# Patient Record
Sex: MALE | Race: WHITE | NOT HISPANIC OR LATINO | Employment: FULL TIME | URBAN - METROPOLITAN AREA
[De-identification: names, ages, dates, MRNs, and addresses within clinical notes are randomized per-mention and may not be internally consistent; named-entity substitution may affect disease eponyms.]

---

## 2017-02-23 ENCOUNTER — OFFICE VISIT (OUTPATIENT)
Dept: URGENT CARE | Facility: PHYSICIAN GROUP | Age: 47
End: 2017-02-23
Payer: COMMERCIAL

## 2017-02-23 VITALS
DIASTOLIC BLOOD PRESSURE: 100 MMHG | RESPIRATION RATE: 16 BRPM | HEART RATE: 72 BPM | SYSTOLIC BLOOD PRESSURE: 150 MMHG | BODY MASS INDEX: 33.16 KG/M2 | TEMPERATURE: 97.9 F | OXYGEN SATURATION: 97 % | WEIGHT: 215 LBS

## 2017-02-23 DIAGNOSIS — R20.2 PARESTHESIA OF RIGHT UPPER AND LOWER EXTREMITY: ICD-10-CM

## 2017-02-23 DIAGNOSIS — M79.2 NEUROPATHIC PAIN: ICD-10-CM

## 2017-02-23 PROCEDURE — 99203 OFFICE O/P NEW LOW 30 MIN: CPT | Performed by: FAMILY MEDICINE

## 2017-02-23 RX ORDER — GABAPENTIN 300 MG/1
300 CAPSULE ORAL 3 TIMES DAILY
Qty: 90 CAP | Refills: 1 | Status: SHIPPED | OUTPATIENT
Start: 2017-02-23 | End: 2019-06-03

## 2017-02-23 ASSESSMENT — ENCOUNTER SYMPTOMS: MYALGIAS: 0

## 2017-02-23 NOTE — PROGRESS NOTES
Subjective:      Al Corcoran is a 46 y.o. male who presents with Hand Pain            HPI  3-4 days numbness and tingling right FA and hand with severe burning pain at night. Seem to originate at medial elbow. No weakness. 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 No fever. No redness. He does get some relief with sitting up and hanging his arm down at night. No neck pain.   No other aggravating or alleviating factors.  Min relief with NSAID    Review of Systems   Musculoskeletal: Negative for myalgias.        No shoulder or wrist pain   Skin: Negative for itching and rash.     .  Medications, Allergies, and current problem list reviewed today in Epic       Objective:     /100 mmHg  Pulse 72  Temp(Src) 36.6 °C (97.9 °F)  Resp 16  Wt 97.523 kg (215 lb)  SpO2 97%     Physical Exam   Constitutional: He appears well-developed and well-nourished. No distress.   HENT:   Head: Normocephalic and atraumatic.   Musculoskeletal:   No point bony tenderness. FROM elbow and shoulder. Vascular intact.    Neurological:   Sensory decreased to 3-5 fingers. There does seem to be a decrease on ulnar aspect 4th finger compared to radial aspect although distribution is not clearly ulnar n given 3rd finger involvement. Strength 5/5 throughout.      Skin: Skin is warm and dry. No rash noted.               Assessment/Plan:     1. Paresthesia of right upper and lower extremity    - REFERRAL TO NEURODIAGNOSTICS (EEG,EP,EMG/NCS/DBS) Modality Requested: NCS-Comment Extremities  - gabapentin (NEURONTIN) 300 MG Cap; Take 1 Cap by mouth 3 times a day.  Dispense: 90 Cap; Refill: 1    2. Neuropathic pain    - gabapentin (NEURONTIN) 300 MG Cap; Take 1 Cap by mouth 3 times a day.  Dispense: 90 Cap; Refill: 1    Suspect ulnar neuropathy although ddx includes thoracic outlet syndrome  Trial gabapentin  F/u NCS.

## 2017-02-23 NOTE — MR AVS SNAPSHOT
Al Corcoran   2017 2:00 PM   Office Visit   MRN: [id number]    Department:  [facility]   Dept Phone:  [phone]    Description:  Male : 1970   Provider:  Trever Julien M.D.           Reason for Visit     Hand Pain numbess and pain R hand, Burning sensation R elbow X 3-4 days      Allergies as of 2017     Allergen Noted Reactions    Peanuts [Peanut Oil] 2017   Anaphylaxis      You were diagnosed with     Paresthesia of right upper and lower extremity   [0645898]       Neuropathic pain   [194513]         Vital Signs     Blood Pressure Pulse Temperature Respirations Weight Oxygen Saturation    150/100 mmHg 72 36.6 °C (97.9 °F) 16 97.523 kg (215 lb) 97%    Smoking Status                   Never Smoker            Basic Information     Date Of Birth Sex Race Ethnicity Preferred Language    1970 Male White Non- English      Health Maintenance     Patient has no pending health maintenance at this time      Current Immunizations     No immunizations on file.      Below and/or attached are the medications your provider expects you to take. Review all of your home medications and newly ordered medications with your provider and/or pharmacist. Follow medication instructions as directed by your provider and/or pharmacist. Please keep your medication list with you and share with your provider. Update the information when medications are discontinued, doses are changed, or new medications (including over-the-counter products) are added; and carry medication information at all times in the event of emergency situations     Allergies:  PEANUTS - Anaphylaxis               Medications  Valid as of: 2017 -  4:20 PM    Generic Name Brand Name Tablet Size Instructions for use    Gabapentin (Cap) NEURONTIN 300 MG Take 1 Cap by mouth 3 times a day.        Lansoprazole (CAPSULE DELAYED RELEASE) PREVACID 30 MG Take 30 mg by mouth every day.        Losartan Potassium  (Tab) COZAAR 100 MG Take 100 mg by mouth every day.        Pravastatin Sodium (Tab) PRAVACHOL 20 MG Take 20 mg by mouth every evening.        .                 Medicines prescribed today were sent to:     [facility] [address] [place] [address] [address] [facility]    [address] [facility] [address]    Phone: [phone] Fax: [phone]    Open 24 Hours?: No      Medication refill instructions:       If your prescription bottle indicates you have medication refills left, it is not necessary to call your provider’s office. Please contact your pharmacy and they will refill your medication.    If your prescription bottle indicates you do not have any refills left, you may request refills at any time through one of the following ways: The online [employer] system (except Urgent Care), by calling your provider’s office, or by asking your pharmacy to contact your provider’s office with a refill request. Medication refills are processed only during regular business hours and may not be available until the next business day. Your provider may request additional information or to have a follow-up visit with you prior to refilling your medication.   *Please Note: Medication refills are assigned a new Rx number when refilled electronically. Your pharmacy may indicate that no refills were authorized even though a new prescription for the same medication is available at the pharmacy. Please request the medicine by name with the pharmacy before contacting your provider for a refill.        Referral     A referral request has been sent to our patient care coordination department. Please allow 3-5 business days for us to process this request and contact you either by phone or mail. If you do not hear from us by the 5th business day, please call us at [phone].           [employer] Access Code: [email or web address]  Expires: 3/25/2017  1:53 PM    [employer]  A secure, online tool to manage your health information     [facility]’s MyChart® is a secure, online tool that connects you to your personalized health information from the privacy of your home -- day or night - making it very easy for you to manage your healthcare. Once the activation process is completed, you can even access your medical information using the [employer] blossom, which is available for free in the Apple Blossom store or Google Play store.     [employer] provides the following levels of access (as shown below):   My Chart Features   [facility] Doctor [facility]  Specialists [facility]  Urgent  Care Non-[facility]  Primary Care  Doctor   Email your healthcare team securely and privately 24/7 X X X    Manage appointments: schedule your next appointment; view details of past/upcoming appointments X      Request prescription refills. X      View recent personal medical records, including lab and immunizations X X X X   View health record, including health history, allergies, medications X X X X   Read reports about your outpatient visits, procedures, consult and ER notes X X X X   See your discharge summary, which is a recap of your hospital and/or ER visit that includes your diagnosis, lab results, and care plan. X X       How to register for [employer]:  1. Go to  [email or web address].  2. Click on the Sign Up Now box, which takes you to the New Member Sign Up page. You will need to provide the following information:  a. Enter your [employer] Access Code exactly as it appears at the top of this page. (You will not need to use this code after you’ve completed the sign-up process. If you do not sign up before the expiration date, you must request a new code.)   b. Enter your date of birth.   c. Enter your home email address.   d. Click Submit, and follow the next screen’s instructions.  3. Create a [employer] ID. This will be your [employer] login ID and cannot be changed, so think of one that is secure and easy to remember.  4. Create a [employer] password. You can change your password at  any time.  5. Enter your Password Reset Question and Answer. This can be used at a later time if you forget your password.   6. Enter your e-mail address. This allows you to receive e-mail notifications when new information is available in [employer].  7. Click Sign Up. You can now view your health information.    For assistance activating your [employer] account, call [phone]

## 2017-04-11 ENCOUNTER — APPOINTMENT (OUTPATIENT)
Dept: NEUROLOGY | Facility: MEDICAL CENTER | Age: 47
End: 2017-04-11
Payer: COMMERCIAL

## 2017-09-28 ENCOUNTER — OFFICE VISIT (OUTPATIENT)
Dept: NEUROLOGY | Facility: MEDICAL CENTER | Age: 47
End: 2017-09-28
Payer: COMMERCIAL

## 2017-09-28 ENCOUNTER — HOSPITAL ENCOUNTER (OUTPATIENT)
Dept: RADIOLOGY | Facility: MEDICAL CENTER | Age: 47
End: 2017-09-28
Attending: PSYCHIATRY & NEUROLOGY
Payer: COMMERCIAL

## 2017-09-28 VITALS
OXYGEN SATURATION: 97 % | SYSTOLIC BLOOD PRESSURE: 136 MMHG | RESPIRATION RATE: 14 BRPM | DIASTOLIC BLOOD PRESSURE: 84 MMHG | BODY MASS INDEX: 32.58 KG/M2 | TEMPERATURE: 97.5 F | HEIGHT: 68 IN | WEIGHT: 215 LBS | HEART RATE: 72 BPM

## 2017-09-28 DIAGNOSIS — M19.90 ARTHRITIS: ICD-10-CM

## 2017-09-28 DIAGNOSIS — M25.521 RIGHT ELBOW PAIN: ICD-10-CM

## 2017-09-28 PROCEDURE — 99204 OFFICE O/P NEW MOD 45 MIN: CPT | Performed by: PSYCHIATRY & NEUROLOGY

## 2017-09-28 PROCEDURE — 73110 X-RAY EXAM OF WRIST: CPT | Mod: RT

## 2017-09-28 RX ORDER — IBUPROFEN 800 MG/1
800 TABLET ORAL EVERY 8 HOURS PRN
COMMUNITY
End: 2021-04-13

## 2017-09-28 NOTE — PATIENT INSTRUCTIONS
IMPRESSION:    1. Right elbow and wrist pain -- for months ( Elbow first)  2. Hx of possible cervical radiculopathy  3. Hx of hyperlipidemia-- not taking medication)  PLAN/RECOMMENDATIONS:      Will offer blood tests and X rays of right wrist  The patient probably will benefit by following with PCP       SIGNATURE:  Solomon Vaughan

## 2017-09-28 NOTE — PROGRESS NOTES
NEUROLOGY NOTE    Referring Physician  self      CHIEF COMPLAINT:  The pain over right elbow --- March 2017--  In the past couple of months--- spread to the right wrist  Chief Complaint   Patient presents with   • New Patient     RUE nerve pain       PRESENT ILLNESS:   The pain over right elbow --- March 2017--  In the past couple of months--- spread to the right wrist    He did heavy lifting before the right elbow pain    He is waiting for [facility] EMG Dec 2017    PAST MEDICAL HISTORY:  Past Medical History:   Diagnosis Date   • High cholesterol    • High triglycerides    • Hypertension        PAST SURGICAL HISTORY:  No past surgical history on file.    FAMILY HISTORY:  History reviewed. No pertinent family history.    SOCIAL HISTORY:  Social History     Social History   • Marital status:      Spouse name: N/A   • Number of children: N/A   • Years of education: N/A     Occupational History   • Not on file.     Social History Main Topics   • Smoking status: Never Smoker   • Smokeless tobacco: Never Used   • Alcohol use Yes      Comment: occ   • Drug use: No   • Sexual activity: Not on file     Other Topics Concern   • Not on file     Social History Narrative   • No narrative on file     ALLERGIES:  Allergies   Allergen Reactions   • Peanuts [Peanut Oil] Anaphylaxis     TOBHX  History   Smoking Status   • Never Smoker   Smokeless Tobacco   • Never Used     ALCHX  History   Alcohol Use   • Yes     Comment: occ     DRUGHX  History   Drug Use No           MEDICATIONS:  Current Outpatient Prescriptions   Medication   • ibuprofen (MOTRIN) 800 MG Tab   • gabapentin (NEURONTIN) 300 MG Cap   • losartan (COZAAR) 100 MG Tab   • lansoprazole (PREVACID) 30 MG CPDR   • pravastatin (PRAVACHOL) 20 MG TABS     No current facility-administered medications for this visit.        REVIEW OF SYSTEM:    Constitutional: Denies fevers, Denies weight changes   Eyes: Denies changes in vision, no eye pain   Ears/Nose/Throat/Mouth: Denies  "nasal congestion or sore throat   Cardiovascular: Denies chest pain or palpitations   Respiratory: Denies SOB.   Gastrointestinal/Hepatic: Denies abdominal pain, nausea, vomiting, diarrhea, constipation or GI bleeding   Genitourinary: Denies bladder dysfunction, dysuria or frequency   Musculoskeletal/Rheum:right elbow and right wrist pain  Skin/Breast: Denies rash, denies breast lumps or discharge   Neurological: Denies headache, confusion, memory loss or focal weakness/parasthesias   Psychiatric: denies mood disorder   Endocrine: denies hx of diabetes or thyroid dysfunction   Heme/Oncology/Lymph Nodes: Denies enlarged lymph nodes, denies brusing or known bleeding disorder   Allergic/Immunologic: Denies hx of allergies         PHYSICAL AND NEUROLOGICAL EXMAINATIONS:  VITAL SIGNS: /84   Pulse 72   Temp 36.4 °C (97.5 °F)   Resp 14   Ht 1.715 m (5' 7.52\")   Wt 97.5 kg (215 lb)   SpO2 97%   BMI 33.16 kg/m²   CURRENT WEIGHT:   BMI: Body mass index is 33.16 kg/m².  PREVIOUS WEIGHTS:  Wt Readings from Last 25 Encounters:   09/28/17 97.5 kg (215 lb)   02/23/17 97.5 kg (215 lb)   03/25/11 96.2 kg (212 lb)       General appearance of patient: WDWN(+) NAD(+)    EYES  o Fundus : Papilledem(-) Exudates(-) Hemorrhage(-)  Nervous System  Orientation to time, place and person(+)  Memory normal(+)  Language: aphasia(-)  Knowledge: past(+) Current(+)  Attention(+)  Cranial Nerves  • Nerve 2: intact  • Nerve 3,4,6: intact  • Nerve 5 : intact  • Nerve 7: intact  • Nerve 8: intact  • Nerve 9 & 10: intact  • Nerve 11: intact  • Nerve 12: intact  Muscle Power and muscle tone: symmetric, normal in upper and lower  Sensory System: Pin sensation intact(+)  Reflexes: symmetric throughout  Cerebellar Function FNP normal   Gait : Steady(+) TandemGait steady(+)  Heart and Vascular  Peripheral Vasucular system : Edema (-) Swelling(-)  RHB, Breathing sound clear  abdomen bowel sound normoactive  Extremities freely moveable  Joints no " contracture       NEUROIMAGING: I reviewed the MRI/CT of brain         IMPRESSION:    1. Right elbow and wrist pain -- for months ( Elbow first)  2. Hx of possible cervical radiculopathy  3. Hx of hyperlipidemia-- not taking medication)  PLAN/RECOMMENDATIONS:      Will offer blood tests and X rays of right wrist  The patient probably will benefit by following with PCP       SIGNATURE:  Solomon Vaughan

## 2017-10-13 ENCOUNTER — NON-PROVIDER VISIT (OUTPATIENT)
Dept: NEUROLOGY | Facility: MEDICAL CENTER | Age: 47
End: 2017-10-13
Payer: COMMERCIAL

## 2017-10-13 DIAGNOSIS — R20.2 PARESTHESIA OF RIGHT UPPER AND LOWER EXTREMITY: ICD-10-CM

## 2017-10-13 PROCEDURE — 95886 MUSC TEST DONE W/N TEST COMP: CPT

## 2017-10-13 PROCEDURE — 95908 NRV CNDJ TST 3-4 STUDIES: CPT

## 2017-10-13 NOTE — PROGRESS NOTES
EMG and nerve conduction studies and right upper extremity were performed for wrist pain and numbness and elbow pain. Waveforms are attached. The median motor and sensory latencies across the wrist were prolonged significantly but proximal conduction velocities were normal. Ulnar motor conduction study around the elbow was normal in terms of latencies and ulnar sensory conduction latencies were normal.  Exam was done of the right upper extremity. Abductor pollicis brevis had increased amplitude polyphasic motor units acute denervation was seen. Biceps, triceps, deltoid, all were normal in terms of insertional activity and motor units.    This appears to be a carpal tunnel syndrome there is no evidence of ulnar nerve entrapment at the elbow nor any evidence of cervical radiculopathy.    Nerve Conduction Studies     Stim Site NR Peak (ms) Norm Peak (ms) P-T Amp (µV) Norm O-P Amp Site1 Site2 Delta-P (ms) Dist (cm) [place] (m/s) Norm [place] (m/s)   Left Sural Anti Sensory (Lat Mall)  22.5°C   Calf    3.5   >5 Calf Lat Mall 3.5 14.0 40 >40        Stim Site NR Onset (ms) Norm Onset (ms) O-P Amp (mV) Norm O-P Amp Site1 Site2 Delta-0 (ms) Dist (cm) [place] (m/s) Norm [place] (m/s)   Left Peroneal EDB Motor (Ext Dig Brev)  22.6°C   Ankle    4.5 <5.5 6.8 >3.0 B Fib Ankle 6.4 31.0 48 >40   B Fib    10.9  7.5  Poplt B Fib 1.4 10.0 71    Poplt    12.3  8.7          Left Tibial Motor (Abd Young Brev)  22.4°C   Ankle    5.1 <6 9.6 >8 Knee Ankle 8.4 39.0 46 >40   Knee    13.5  3.9                   Electromyography     Side Muscle Nerve Root Ins Act Fibs Psw Amp Dur Poly Recrt Int Pat Comment   Left VastusLat Femoral L2-4 Nml Nml Nml Nml Nml 0 Nml Nml    Left Gastroc Tibial S1-2 Nml Nml Nml Nml Nml 0 Nml Nml    Left AbdHallucis MedPlantar S1-2 Nml Nml Nml Nml Nml 0 Nml Nml    Left QuadratusFem QuadFemoris L4-5, S1 Nml Nml Nml Nml Nml 0 Nml Nml    Left GluteusMed SupGluteal L5-S1 Nml Nml Nml Nml Nml 0 Nml Nml    Left GluteusMax InfGluteal L5-S2 Nml  Nml Nml Nml Nml 0 Nml Nml    Left Cervical Parasp Low Rami C7-8 Nml Nml Nml

## 2018-01-16 ENCOUNTER — OFFICE VISIT (OUTPATIENT)
Dept: URGENT CARE | Facility: CLINIC | Age: 48
End: 2018-01-16
Payer: COMMERCIAL

## 2018-01-16 VITALS
HEIGHT: 67 IN | WEIGHT: 210 LBS | DIASTOLIC BLOOD PRESSURE: 84 MMHG | OXYGEN SATURATION: 96 % | BODY MASS INDEX: 32.96 KG/M2 | TEMPERATURE: 97.3 F | SYSTOLIC BLOOD PRESSURE: 118 MMHG | HEART RATE: 102 BPM | RESPIRATION RATE: 16 BRPM
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
## 2018-01-16 PROCEDURE — 90471 IMMUNIZATION ADMIN: CPT | Performed by: PHYSICIAN ASSISTANT

## 2018-01-16 PROCEDURE — 90715 TDAP VACCINE 7 YRS/> IM: CPT | Performed by: PHYSICIAN ASSISTANT

## 2018-01-16 PROCEDURE — 99214 OFFICE O/P EST MOD 30 MIN: CPT | Mod: 25 | Performed by: PHYSICIAN ASSISTANT

## 2018-01-16 RX ORDER — AMOXICILLIN AND CLAVULANATE POTASSIUM 875; 125 MG/1; MG/1
1 TABLET, FILM COATED ORAL 2 TIMES DAILY
Qty: 14 TAB | Refills: 0 | Status: SHIPPED | OUTPATIENT
Start: 2018-01-16 | End: 2018-01-23

## 2018-01-17 ASSESSMENT — ENCOUNTER SYMPTOMS
DIARRHEA: 0
DIZZINESS: 0
SHORTNESS OF BREATH: 0
VOMITING: 0
CHILLS: 0
COUGH: 0
MUSCULOSKELETAL NEGATIVE: 1
NAUSEA: 0
FEVER: 0
HEADACHES: 0
ABDOMINAL PAIN: 0
MYALGIAS: 0

## 2018-01-17 NOTE — PROGRESS NOTES
"Subjective:      Al Corcoran is a 47 y.o. male who presents with Cat Bite        Patient is accompanied by his wife and daughter.     Animal Bite   This is a new problem. The current episode started yesterday. The problem occurs constantly. The problem has been gradually worsening. Associated symptoms include a rash. Pertinent negatives include no abdominal pain, chest pain, chills, congestion, coughing, fever, headaches, myalgias, nausea or vomiting. Nothing aggravates the symptoms. He has tried nothing for the symptoms.     Patient presents to urgent care reporting a 1 day history of a cat bite on his left forearm. The cat is his family's pet and is UTD on all vaccinations including rabies. He woke up this morning with the area red, swollen, and painful and has been gradually worsening all day. No fevers, chills, body aches, nausea, or vomiting. Unknown date of last tetanus booster.     Review of Systems   Constitutional: Negative for chills and fever.   HENT: Negative for congestion.    Respiratory: Negative for cough and shortness of breath.    Cardiovascular: Negative for chest pain.   Gastrointestinal: Negative for abdominal pain, diarrhea, nausea and vomiting.   Genitourinary: Negative.    Musculoskeletal: Negative.  Negative for myalgias.   Skin: Positive for rash.        Positive for cat bite   Neurological: Negative for dizziness and headaches.          Objective:     /84   Pulse (!) 102   Temp 36.3 °C (97.3 °F)   Resp 16   Ht 1.702 m (5' 7\")   Wt 95.3 kg (210 lb)   SpO2 96%   BMI 32.89 kg/m²      Physical Exam   Constitutional: He is oriented to person, place, and time. He appears well-developed and well-nourished. No distress.   HENT:   Head: Normocephalic and atraumatic.   Eyes: Conjunctivae are normal. Pupils are equal, round, and reactive to light. Right eye exhibits no discharge. Left eye exhibits no discharge.   Neck: Normal range of motion.   Cardiovascular: Normal rate.  "   Pulmonary/Chest: Effort normal.   Musculoskeletal: Normal range of motion.   Neurological: He is alert and oriented to person, place, and time.   Skin: Skin is warm and dry. He is not diaphoretic.        Multiple superficial puncture wounds noted on flexural aspect of left forearm with surrounding erythema, edema, and +hot to touch. No proximal streaking.    Psychiatric: He has a normal mood and affect. His behavior is normal.   Nursing note and vitals reviewed.         PMH:  has a past medical history of High cholesterol; High triglycerides; and Hypertension.  MEDS:   Current Outpatient Prescriptions:   •  amoxicillin-clavulanate (AUGMENTIN) 875-125 MG Tab, Take 1 Tab by mouth 2 times a day for 7 days., Disp: 14 Tab, Rfl: 0  •  ibuprofen (MOTRIN) 800 MG Tab, Take 800 mg by mouth every 8 hours as needed., Disp: , Rfl:   •  gabapentin (NEURONTIN) 300 MG Cap, Take 1 Cap by mouth 3 times a day., Disp: 90 Cap, Rfl: 1  •  losartan (COZAAR) 100 MG Tab, Take 100 mg by mouth every day., Disp: , Rfl:   •  lansoprazole (PREVACID) 30 MG CPDR, Take 30 mg by mouth every day., Disp: , Rfl:   •  pravastatin (PRAVACHOL) 20 MG TABS, Take 20 mg by mouth every evening., Disp: , Rfl:   ALLERGIES:   Allergies   Allergen Reactions   • Peanuts [Peanut Oil] Anaphylaxis     SURGHX: History reviewed. No pertinent surgical history.  SOCHX:  reports that he has never smoked. He has never used smokeless tobacco. He reports that he drinks alcohol. He reports that he does not use drugs.  FH: family history is not on file.       Assessment/Plan:     1. Infected cat bite, initial encounter  - amoxicillin-clavulanate (AUGMENTIN) 875-125 MG Tab; Take 1 Tab by mouth 2 times a day for 7 days.  Dispense: 14 Tab; Refill: 0   - Complete full course of antibiotics as prescribed   - Tdap =>8yo IM    Area of cellulitis outlined at today's visit with sonny hess. Encouraged to monitor the area closely and to RTC for any new/worsening symptoms. The patient  demonstrated a good understanding and agreed with the treatment plan.

## 2018-03-22 ENCOUNTER — EH NON-PROVIDER (OUTPATIENT)
Dept: OCCUPATIONAL MEDICINE | Facility: CLINIC | Age: 48
End: 2018-03-22

## 2018-03-22 ENCOUNTER — HOSPITAL ENCOUNTER (OUTPATIENT)
Facility: MEDICAL CENTER | Age: 48
End: 2018-03-22
Attending: PREVENTIVE MEDICINE
Payer: COMMERCIAL

## 2018-03-22 ENCOUNTER — EMPLOYEE HEALTH (OUTPATIENT)
Dept: OCCUPATIONAL MEDICINE | Facility: CLINIC | Age: 48
End: 2018-03-22

## 2018-03-22 VITALS
DIASTOLIC BLOOD PRESSURE: 98 MMHG | WEIGHT: 215 LBS | BODY MASS INDEX: 33.74 KG/M2 | HEIGHT: 67 IN | SYSTOLIC BLOOD PRESSURE: 138 MMHG

## 2018-03-22 DIAGNOSIS — Z02.1 PRE-EMPLOYMENT HEALTH SCREENING EXAMINATION: ICD-10-CM

## 2018-03-22 DIAGNOSIS — Z02.1 PRE-EMPLOYMENT DRUG SCREENING: ICD-10-CM

## 2018-03-22 LAB
AMP AMPHETAMINE: NORMAL
BAR BARBITURATES: NORMAL
BZO BENZODIAZEPINES: NORMAL
COC COCAINE: NORMAL
INT CON NEG: NORMAL
INT CON POS: NORMAL
MDMA ECSTASY: NORMAL
MET METHAMPHETAMINES: NORMAL
MTD METHADONE: NORMAL
OPI OPIATES: NORMAL
OXY OXYCODONE: NORMAL
PCP PHENCYCLIDINE: NORMAL
POC URINE DRUG SCREEN OCDRS: NORMAL
THC: NORMAL

## 2018-03-22 PROCEDURE — 80305 DRUG TEST PRSMV DIR OPT OBS: CPT | Performed by: PREVENTIVE MEDICINE

## 2018-03-22 PROCEDURE — 86787 VARICELLA-ZOSTER ANTIBODY: CPT | Performed by: PREVENTIVE MEDICINE

## 2018-03-22 PROCEDURE — 90707 MMR VACCINE SC: CPT | Performed by: PREVENTIVE MEDICINE

## 2018-03-22 PROCEDURE — 8915 PR COMPREHENSIVE PHYSICAL: Performed by: PREVENTIVE MEDICINE

## 2018-03-22 PROCEDURE — 86480 TB TEST CELL IMMUN MEASURE: CPT | Performed by: PREVENTIVE MEDICINE

## 2018-03-22 ASSESSMENT — VISUAL ACUITY
OD_CC: 20/20
OS_CC: 20/20

## 2018-03-23 LAB — VZV IGG SER IA-ACNC: POSITIVE

## 2018-03-24 LAB
M TB TUBERC IFN-G BLD QL: NEGATIVE
M TB TUBERC IFN-G/MITOGEN IGNF BLD: 0.04
M TB TUBERC IGNF/MITOGEN IGNF CONTROL: 41.17 [IU]/ML
MITOGEN IGNF BCKGRD COR BLD-ACNC: 0.06 [IU]/ML

## 2018-03-26 ENCOUNTER — DOCUMENTATION (OUTPATIENT)
Dept: OCCUPATIONAL MEDICINE | Facility: CLINIC | Age: 48
End: 2018-03-26

## 2018-03-26 NOTE — PROGRESS NOTES
Pre-employment and medical clearance reviewed and signed. Quantiferon result documented in immunizations. Document scanned and returned to MA.

## 2018-08-09 ENCOUNTER — OFFICE VISIT (OUTPATIENT)
Dept: URGENT CARE | Facility: PHYSICIAN GROUP | Age: 48
End: 2018-08-09
Payer: COMMERCIAL

## 2018-08-09 VITALS
SYSTOLIC BLOOD PRESSURE: 122 MMHG | BODY MASS INDEX: 33.12 KG/M2 | WEIGHT: 211 LBS | RESPIRATION RATE: 12 BRPM | DIASTOLIC BLOOD PRESSURE: 76 MMHG | HEIGHT: 67 IN | HEART RATE: 68 BPM | OXYGEN SATURATION: 98 % | TEMPERATURE: 98.5 F

## 2018-08-09 DIAGNOSIS — M54.50 ACUTE RIGHT-SIDED LOW BACK PAIN WITHOUT SCIATICA: ICD-10-CM

## 2018-08-09 LAB
APPEARANCE UR: CLEAR
BILIRUB UR STRIP-MCNC: NORMAL MG/DL
COLOR UR AUTO: NORMAL
GLUCOSE UR STRIP.AUTO-MCNC: NORMAL MG/DL
KETONES UR STRIP.AUTO-MCNC: NORMAL MG/DL
LEUKOCYTE ESTERASE UR QL STRIP.AUTO: NORMAL
NITRITE UR QL STRIP.AUTO: NORMAL
PH UR STRIP.AUTO: 7 [PH] (ref 5–8)
PROT UR QL STRIP: NORMAL MG/DL
RBC UR QL AUTO: NORMAL
SP GR UR STRIP.AUTO: 1
UROBILINOGEN UR STRIP-MCNC: NORMAL MG/DL

## 2018-08-09 PROCEDURE — 81002 URINALYSIS NONAUTO W/O SCOPE: CPT | Performed by: PHYSICIAN ASSISTANT

## 2018-08-09 PROCEDURE — 99214 OFFICE O/P EST MOD 30 MIN: CPT | Performed by: PHYSICIAN ASSISTANT

## 2018-08-09 RX ORDER — CYCLOBENZAPRINE HCL 10 MG
10 TABLET ORAL 3 TIMES DAILY PRN
Qty: 30 TAB | Refills: 0 | Status: SHIPPED | OUTPATIENT
Start: 2018-08-09 | End: 2019-06-03

## 2018-08-09 RX ORDER — KETOROLAC TROMETHAMINE 30 MG/ML
60 INJECTION, SOLUTION INTRAMUSCULAR; INTRAVENOUS ONCE
Status: COMPLETED | OUTPATIENT
Start: 2018-08-09 | End: 2018-08-09

## 2018-08-09 RX ADMIN — KETOROLAC TROMETHAMINE 60 MG: 30 INJECTION, SOLUTION INTRAMUSCULAR; INTRAVENOUS at 19:01

## 2018-08-10 ASSESSMENT — ENCOUNTER SYMPTOMS
FLANK PAIN: 1
FEVER: 0
SHORTNESS OF BREATH: 0
BOWEL INCONTINENCE: 0
BACK PAIN: 1
VOMITING: 0
TINGLING: 0
DIARRHEA: 0
ABDOMINAL PAIN: 0
DIZZINESS: 0
NAUSEA: 0
CHILLS: 0

## 2018-08-10 NOTE — PROGRESS NOTES
"Subjective:      Al Corcoran is a 48 y.o. male who presents with Flank Pain (right side flank pain)            Back Pain   This is a new problem. The current episode started in the past 7 days (5 days). The problem occurs constantly. The problem is unchanged. The pain is present in the lumbar spine. The quality of the pain is described as aching. The pain does not radiate. The pain is moderate. The symptoms are aggravated by bending. Pertinent negatives include no abdominal pain, bladder incontinence, bowel incontinence, chest pain, dysuria, fever or tingling. He has tried nothing for the symptoms.     Patient presents to urgent care reporting a 5 day history of right lower back pain. He denies any recent falls or trauma. No history of chronic low back pain. No history of kidney stones. Pain is exacerbated by prolonged standing or sitting.     Review of Systems   Constitutional: Negative for chills and fever.   HENT: Negative for congestion.    Respiratory: Negative for shortness of breath.    Cardiovascular: Negative for chest pain.   Gastrointestinal: Negative for abdominal pain, bowel incontinence, diarrhea, nausea and vomiting.   Genitourinary: Positive for flank pain. Negative for bladder incontinence, dysuria, frequency, hematuria and urgency.   Musculoskeletal: Positive for back pain.   Skin: Negative for rash.   Neurological: Negative for dizziness and tingling.        Objective:     /76   Pulse 68   Temp 36.9 °C (98.5 °F)   Resp 12   Ht 1.702 m (5' 7\")   Wt 95.7 kg (211 lb)   SpO2 98%   BMI 33.05 kg/m²      Physical Exam   Constitutional: He is oriented to person, place, and time. He appears well-developed and well-nourished. No distress.   HENT:   Head: Normocephalic and atraumatic.   Eyes: Pupils are equal, round, and reactive to light.   Neck: Normal range of motion.   Cardiovascular: Normal rate.    Pulmonary/Chest: Effort normal.   Abdominal: Soft. Bowel sounds are normal. He exhibits " no distension. There is no tenderness. There is no guarding.   No CVAT bilaterally   Musculoskeletal: Normal range of motion.        Lumbar back: He exhibits pain. He exhibits normal range of motion, no tenderness, no bony tenderness and no spasm.   No midline spinal tenderness of step off deformities noted.    Neurological: He is alert and oriented to person, place, and time.   Skin: Skin is warm and dry. He is not diaphoretic.   Psychiatric: He has a normal mood and affect. His behavior is normal.   Nursing note and vitals reviewed.              PMH:  has a past medical history of High cholesterol; High triglycerides; and Hypertension.  MEDS:   Current Outpatient Prescriptions:   •  cyclobenzaprine (FLEXERIL) 10 MG Tab, Take 1 Tab by mouth 3 times a day as needed., Disp: 30 Tab, Rfl: 0  •  ibuprofen (MOTRIN) 800 MG Tab, Take 800 mg by mouth every 8 hours as needed., Disp: , Rfl:   •  gabapentin (NEURONTIN) 300 MG Cap, Take 1 Cap by mouth 3 times a day., Disp: 90 Cap, Rfl: 1  •  losartan (COZAAR) 100 MG Tab, Take 100 mg by mouth every day., Disp: , Rfl:   •  lansoprazole (PREVACID) 30 MG CPDR, Take 30 mg by mouth every day., Disp: , Rfl:   •  pravastatin (PRAVACHOL) 20 MG TABS, Take 20 mg by mouth every evening., Disp: , Rfl:   ALLERGIES:   Allergies   Allergen Reactions   • Peanuts [Peanut Oil] Anaphylaxis     SURGHX: History reviewed. No pertinent surgical history.  SOCHX:  reports that he has never smoked. He has never used smokeless tobacco. He reports that he drinks alcohol. He reports that he does not use drugs.  FH: family history is not on file.    POCT Urinalysis:  Component Results     Component Value Ref Range & Units Status   POC Color light yellow  Negative Final   POC Appearance clear  Negative Final   POC Leukocyte Esterase neg  Negative Final   POC Nitrites neg  Negative Final   POC Urobiligen neg  Negative (0.2) mg/dL Final   POC Protein neg  Negative mg/dL Final   POC Urine PH 7.0  5.0 - 8.0 Final    POC Blood neg  Negative Final   POC Specific Gravity 1.005  <1.005 - >1.030 Final   POC Ketones neg  Negative mg/dL Final   POC Bilirubin neg  Negative mg/dL Final   POC Glucose neg  Negative mg/dL Final   Last Resulted Time   Thu Aug 9, 2018  8:09 PM     Assessment/Plan:     1. Acute right-sided low back pain without sciatica  - POCT Urinalysis --> normal  - ketorolac (TORADOL) injection 60 mg; 2 mL by Intramuscular route Once.   - Given in clinic, patient tolerated well  - cyclobenzaprine (FLEXERIL) 10 MG Tab; Take 1 Tab by mouth 3 times a day as needed.  Dispense: 30 Tab; Refill: 0   - Will cause sedation, avoid driving, operating heavy machinery, and drinking alcohol    Advised patient back pain is most likely musculoskeletal in etiology. Encouraged icing/heating 2-3 times daily followed by gentle massage and stretching. Flexeril as needed for muscle spasms. Monitor closely and RTC or follow up with primary care if symptoms persist/worsen. The patient demonstrated a good understanding and agreed with the treatment plan.

## 2018-10-02 ENCOUNTER — IMMUNIZATION (OUTPATIENT)
Dept: OCCUPATIONAL MEDICINE | Facility: CLINIC | Age: 48
End: 2018-10-02

## 2018-10-02 DIAGNOSIS — Z23 NEED FOR VACCINATION: ICD-10-CM

## 2018-10-02 PROCEDURE — 90686 IIV4 VACC NO PRSV 0.5 ML IM: CPT | Performed by: PREVENTIVE MEDICINE

## 2019-05-09 ENCOUNTER — TELEPHONE (OUTPATIENT)
Dept: HEALTH INFORMATION MANAGEMENT | Facility: OTHER | Age: 49
End: 2019-05-09

## 2019-06-03 ENCOUNTER — OFFICE VISIT (OUTPATIENT)
Dept: MEDICAL GROUP | Facility: MEDICAL CENTER | Age: 49
End: 2019-06-03
Payer: COMMERCIAL

## 2019-06-03 VITALS
DIASTOLIC BLOOD PRESSURE: 80 MMHG | OXYGEN SATURATION: 98 % | TEMPERATURE: 97.6 F | HEART RATE: 77 BPM | WEIGHT: 217 LBS | SYSTOLIC BLOOD PRESSURE: 128 MMHG | BODY MASS INDEX: 34.06 KG/M2 | HEIGHT: 67 IN

## 2019-06-03 DIAGNOSIS — E66.09 CLASS 1 OBESITY DUE TO EXCESS CALORIES WITHOUT SERIOUS COMORBIDITY WITH BODY MASS INDEX (BMI) OF 33.0 TO 33.9 IN ADULT: ICD-10-CM

## 2019-06-03 DIAGNOSIS — R06.09 EXERTIONAL DYSPNEA: ICD-10-CM

## 2019-06-03 DIAGNOSIS — E78.5 HYPERLIPIDEMIA, UNSPECIFIED HYPERLIPIDEMIA TYPE: ICD-10-CM

## 2019-06-03 DIAGNOSIS — R12 HEART BURN: ICD-10-CM

## 2019-06-03 DIAGNOSIS — I10 ESSENTIAL HYPERTENSION: ICD-10-CM

## 2019-06-03 DIAGNOSIS — E78.2 MIXED HYPERLIPIDEMIA: ICD-10-CM

## 2019-06-03 DIAGNOSIS — R42 LIGHTHEADED: ICD-10-CM

## 2019-06-03 PROBLEM — M19.90 ARTHRITIS: Status: RESOLVED | Noted: 2017-09-28 | Resolved: 2019-06-03

## 2019-06-03 PROBLEM — M25.521 RIGHT ELBOW PAIN: Status: RESOLVED | Noted: 2017-09-28 | Resolved: 2019-06-03

## 2019-06-03 PROCEDURE — 99203 OFFICE O/P NEW LOW 30 MIN: CPT | Performed by: INTERNAL MEDICINE

## 2019-06-03 ASSESSMENT — PATIENT HEALTH QUESTIONNAIRE - PHQ9: CLINICAL INTERPRETATION OF PHQ2 SCORE: 0

## 2019-06-04 NOTE — ASSESSMENT & PLAN NOTE
He reports that he has had some lightheadedness for the last few months and he is unsteady walking down the hallway and will occasionally bump into things.  He reports that his ears feel full or like they need to pop.  He reports no recent change in hearing.  The lightheadedness is not postural.  He denies syncope or near syncope.  He denies palpitations.

## 2019-06-04 NOTE — ASSESSMENT & PLAN NOTE
He reports a history of hypertension.  He tries to follow a low-salt diet with moderate success.  Blood pressure today is fairly satisfactory.  He has problems with lightheadedness but that does not seem to be blood pressure he reports.

## 2019-06-04 NOTE — ASSESSMENT & PLAN NOTE
He reports that for the last 3 to 6 months that he has noticed an increase in exertional dyspnea just walking down the hallway or walking up some stairs.  He denies coughing or wheezing.  The dyspnea is not worse if he lays down.  He denies any ankle swelling.

## 2019-06-04 NOTE — PROGRESS NOTES
Subjective:     Chief Complaint   Patient presents with   • Establish Care   • Dizziness     x1-2 months, progressivly worse    • Loss Of Balance   • Shortness of Breath   • Nausea   • Head Ache     Head feels flush around ears    • Edema     Gag reflex    • Pain     Stomach    • Rash     Possible fungus      Al Corcoran is a 49 y.o. male here today to establish himself in this office and for follow-up of exertional dyspnea and lightheadedness and heartburn and hypertension and hyperlipidemia.    Dyspnea on exertion  He reports that for the last 3 to 6 months that he has noticed an increase in exertional dyspnea just walking down the hallway or walking up some stairs.  He denies coughing or wheezing.  The dyspnea is not worse if he lays down.  He denies any ankle swelling.    Essential hypertension  He reports a history of hypertension.  He tries to follow a low-salt diet with moderate success.  Blood pressure today is fairly satisfactory.  He has problems with lightheadedness but that does not seem to be blood pressure he reports.    Heart burn  He reports a history of heartburn that he treats with Tums.    Lightheaded  He reports that he has had some lightheadedness for the last few months and he is unsteady walking down the hallway and will occasionally bump into things.  He reports that his ears feel full or like they need to pop.  He reports no recent change in hearing.  The lightheadedness is not postural.  He denies syncope or near syncope.  He denies palpitations.    Mixed hyperlipidemia  He has a history of hyperlipidemia.  He is not particularly careful about his diet.  He is moderately overweight.    Class 1 obesity due to excess calories without serious comorbidity with body mass index (BMI) of 33.0 to 33.9 in adult  He is significantly overweight with BMI 33.99.  He does not exercise significantly.       Diagnoses of Lightheaded, Exertional dyspnea, Hyperlipidemia, unspecified hyperlipidemia type,  "Essential hypertension, Mixed hyperlipidemia, Heart burn, and Class 1 obesity due to excess calories without serious comorbidity with body mass index (BMI) of 33.0 to 33.9 in adult were pertinent to this visit.    Allergies: Peanuts [peanut oil]  Current medicines (including changes today)  Current Outpatient Prescriptions   Medication Sig Dispense Refill   • ibuprofen (MOTRIN) 800 MG Tab Take 800 mg by mouth every 8 hours as needed.       No current facility-administered medications for this visit.        He  has a past medical history of Class 1 obesity due to excess calories without serious comorbidity with body mass index (BMI) of 33.0 to 33.9 in adult (6/3/2019); Dyspnea on exertion (6/3/2019); Essential hypertension (6/3/2019); Heart burn (6/3/2019); High cholesterol; High triglycerides; Hypertension; Lightheaded (6/3/2019); and Mixed hyperlipidemia (6/3/2019).    ROS    Patient denies significant change in strength, weight or appetite.  No significant lightheadedness or headaches except for the lightheadedness noted above..  No change in vision, hearing, or swallowing.  No new dyspnea, coughing, chest pain, or palpitations other than the exertional dyspnea noted above..  No indigestion, abdominal pain, or change in bowel habits.  He does have heartburn that he treats with Tums.  No change in urinating.  No new ankle swelling.       Objective:     PE:  /80 (BP Location: Left arm, Patient Position: Sitting)   Pulse 77   Temp 36.4 °C (97.6 °F)   Ht 1.702 m (5' 7\")   Wt 98.4 kg (217 lb)   SpO2 98%   BMI 33.99 kg/m²    Neck is supple without significant lymphadenopathy or masses.  Right external canal is clear, the tympanic membrane is dull.  Left ear external canal has a moderate amount of hair but the tympanic membrane that can be seen appears normal.  There is at most a trace of lateral nystagmus on looking to the right.  Lungs are clear with normal breath sounds without wheezes or rales " .  Cardiovascular: peripheral circulation is satisfactory, heart sounds are unchanged and unremarkable.  Abdomen is soft, without masses or tenderness, with normal bowel sounds.  Extremities are without significant edema, cyanosis or deformity.      Assessment and Plan:   The following treatment plan was discussed  1. Lightheaded  Basic Metabolic Panel    He will try using a steroid nasal spray.  We will arrange for ENT consultation.    2. Exertional dyspnea  PULMONARY FUNCTION TESTS -Test requested: Spirometry, simple    We will check spirometry and will otherwise evaluate as indicated.  Consider repeat echocardiogram.   3. Hyperlipidemia, unspecified hyperlipidemia type  Lipid Profile    We will check a lipid panel and inform him of the results.  We reviewed appropriate diet.   4. Essential hypertension      We reviewed low-salt diet.  He will check his pressures at home and report them to us.   5. Mixed hyperlipidemia      We will check a lipid panel and inform him of the results.  We reviewed appropriate diet.   6. Heart burn      We reviewed appropriate conservative therapy.  Consider further evaluation as indicated.   7. Class 1 obesity due to excess calories without serious comorbidity with body mass index (BMI) of 33.0 to 33.9 in adult      He was strongly encouraged in a weight loss program including appropriate diet and exercise as tolerated.  He was cautioned however against falling.       Followup: Return in about 4 weeks (around 7/1/2019) for Long.

## 2019-06-04 NOTE — ASSESSMENT & PLAN NOTE
He has a history of hyperlipidemia.  He is not particularly careful about his diet.  He is moderately overweight.

## 2019-06-12 ENCOUNTER — HOSPITAL ENCOUNTER (OUTPATIENT)
Dept: LAB | Facility: MEDICAL CENTER | Age: 49
End: 2019-06-12
Attending: INTERNAL MEDICINE
Payer: COMMERCIAL

## 2019-06-12 ENCOUNTER — HOSPITAL ENCOUNTER (OUTPATIENT)
Dept: LAB | Facility: MEDICAL CENTER | Age: 49
End: 2019-06-12
Payer: COMMERCIAL

## 2019-06-12 DIAGNOSIS — R42 LIGHTHEADED: ICD-10-CM

## 2019-06-12 DIAGNOSIS — E78.5 HYPERLIPIDEMIA, UNSPECIFIED HYPERLIPIDEMIA TYPE: ICD-10-CM

## 2019-06-12 LAB
ANION GAP SERPL CALC-SCNC: 10 MMOL/L (ref 0–11.9)
BDY FAT % MEASURED: 29.4 %
BP DIAS: 90 MMHG
BP SYS: 125 MMHG
BUN SERPL-MCNC: 20 MG/DL (ref 8–22)
CALCIUM SERPL-MCNC: 9.7 MG/DL (ref 8.5–10.5)
CHLORIDE SERPL-SCNC: 105 MMOL/L (ref 96–112)
CHOLEST SERPL-MCNC: 222 MG/DL (ref 100–199)
CHOLEST SERPL-MCNC: 224 MG/DL (ref 100–199)
CO2 SERPL-SCNC: 24 MMOL/L (ref 20–33)
CREAT SERPL-MCNC: 1.23 MG/DL (ref 0.5–1.4)
DIABETES HTDIA: NO
EVENT NAME HTEVT: NORMAL
FASTING HTFAS: YES
FASTING STATUS PATIENT QL REPORTED: NORMAL
GLUCOSE SERPL-MCNC: 101 MG/DL (ref 65–99)
GLUCOSE SERPL-MCNC: 102 MG/DL (ref 65–99)
HDLC SERPL-MCNC: 32 MG/DL
HDLC SERPL-MCNC: 33 MG/DL
HYPERTENSION HTHYP: NO
LDLC SERPL CALC-MCNC: ABNORMAL MG/DL
LDLC SERPL CALC-MCNC: ABNORMAL MG/DL
POTASSIUM SERPL-SCNC: 4.1 MMOL/L (ref 3.6–5.5)
SCREENING LOC CITY HTCIT: NORMAL
SCREENING LOC STATE HTSTA: NORMAL
SCREENING LOCATION HTLOC: NORMAL
SMOKING HTSMO: NO
SODIUM SERPL-SCNC: 139 MMOL/L (ref 135–145)
SUBSCRIBER ID HTSID: NORMAL
TRIGL SERPL-MCNC: 416 MG/DL (ref 0–149)
TRIGL SERPL-MCNC: 423 MG/DL (ref 0–149)

## 2019-06-12 PROCEDURE — 80061 LIPID PANEL: CPT | Mod: 91

## 2019-06-12 PROCEDURE — S5190 WELLNESS ASSESSMENT BY NONPH: HCPCS

## 2019-06-12 PROCEDURE — 36415 COLL VENOUS BLD VENIPUNCTURE: CPT

## 2019-06-12 PROCEDURE — 80061 LIPID PANEL: CPT

## 2019-06-12 PROCEDURE — 82947 ASSAY GLUCOSE BLOOD QUANT: CPT

## 2019-06-12 PROCEDURE — 80048 BASIC METABOLIC PNL TOTAL CA: CPT

## 2019-07-04 ENCOUNTER — OFFICE VISIT (OUTPATIENT)
Dept: URGENT CARE | Facility: PHYSICIAN GROUP | Age: 49
End: 2019-07-04
Payer: COMMERCIAL

## 2019-07-04 VITALS
DIASTOLIC BLOOD PRESSURE: 86 MMHG | HEART RATE: 86 BPM | WEIGHT: 213 LBS | SYSTOLIC BLOOD PRESSURE: 140 MMHG | HEIGHT: 67 IN | RESPIRATION RATE: 14 BRPM | BODY MASS INDEX: 33.43 KG/M2 | OXYGEN SATURATION: 97 % | TEMPERATURE: 98.5 F

## 2019-07-04 DIAGNOSIS — J06.9 VIRAL URI: ICD-10-CM

## 2019-07-04 LAB
INT CON NEG: NEGATIVE
INT CON POS: POSITIVE
S PYO AG THROAT QL: NEGATIVE

## 2019-07-04 PROCEDURE — 87880 STREP A ASSAY W/OPTIC: CPT | Performed by: FAMILY MEDICINE

## 2019-07-04 PROCEDURE — 99214 OFFICE O/P EST MOD 30 MIN: CPT | Performed by: FAMILY MEDICINE

## 2019-07-06 NOTE — PROGRESS NOTES
Subjective:     Chief Complaint   Patient presents with   • Pharyngitis     body ache/ lethargic/ dry cough/ x1day                 Pharyngitis   This is a new problem. The current episode started in the past 2 days. The problem has been unchanged. There has been no fever. The pain is moderate. Associated symptoms includes: fatigue, dry cough. Pertinent negatives include no abdominal pain,  ,  , diarrhea, headaches, shortness of breath or vomiting. no exposure to strep or mono.   has tried acetaminophen for the symptoms. The treatment provided mild relief.     Social History   Substance Use Topics   • Smoking status: Never Smoker   • Smokeless tobacco: Never Used   • Alcohol use Yes      Comment: occ       Past Medical History:   Diagnosis Date   • Class 1 obesity due to excess calories without serious comorbidity with body mass index (BMI) of 33.0 to 33.9 in adult 6/3/2019   • Dyspnea on exertion 6/3/2019   • Essential hypertension 6/3/2019   • Heart burn 6/3/2019   • High cholesterol    • High triglycerides    • Hypertension    • Lightheaded 6/3/2019   • Mixed hyperlipidemia 6/3/2019         Review of Systems   Constitutional: Negative for fever, chills and malaise/fatigue.   Eyes: Negative for vision changes, d/c.    Respiratory: + for cough .   Denies sputum production.    Cardiovascular: Negative for chest pain and palpitations.   Gastrointestinal: Negative for nausea, vomiting, abdominal pain, diarrhea and constipation.   Genitourinary: Negative for dysuria, urgency and frequency.   Skin: Negative for rash or  itching.   Neurological: Negative for dizziness and tingling.    Psychiatric/Behavioral: Negative for depression.   Hematologic/lymphatic - denies bruising or excessive bleeding  All other systems reviewed and are negative.           Objective:   /86 (BP Location: Left arm, Patient Position: Sitting, BP Cuff Size: Adult)   Pulse 86   Temp 36.9 °C (98.5 °F) (Temporal)   Resp 14   Ht 1.702 m (5'  "7\")   Wt 96.6 kg (213 lb)   SpO2 97%         Physical Exam   Constitutional:   oriented to person, place, and time.  appears well-developed and well-nourished. No distress.   HENT:   Head: Normocephalic and atraumatic.   Right Ear: External ear normal.   Left Ear: External ear normal.   Nose: Mucosal edema present. Right sinus exhibits no maxillary sinus tenderness and no frontal sinus tenderness. Left sinus exhibits no maxillary sinus tenderness and no frontal sinus tenderness.   Mouth/Throat: no posterior oropharyngeal exudate.   There is posterior oropharyngeal erythema present. No posterior oropharyngeal edema.        Eyes: Conjunctivae and EOM are normal. Pupils are equal, round, and reactive to light. Right eye exhibits no discharge. Left eye exhibits no discharge. No scleral icterus.   Neck: Normal range of motion. Neck supple. No JVD present. No tracheal deviation present. No thyromegaly present.   Cardiovascular: Normal rate, regular rhythm, normal heart sounds and intact distal pulses.  Exam reveals no friction rub.    No murmur heard.  Pulmonary/Chest: Effort normal and breath sounds normal. No respiratory distress.   no wheezes.   no rales.    Musculoskeletal:  exhibits no edema.   Lymphadenopathy: no cervical LAD  Neurological:   alert and oriented to person, place, and time.   Skin: Skin is warm and dry. No erythema.   Psychiatric:   normal mood and affect.   Nursing note and vitals reviewed.             Assessment/Plan:     1. Viral URI      rapid strep negative.   Rx motrin 800mg tid, prn  Follow up in one week if no improvement            "

## 2019-07-08 ENCOUNTER — OFFICE VISIT (OUTPATIENT)
Dept: MEDICAL GROUP | Facility: MEDICAL CENTER | Age: 49
End: 2019-07-08
Payer: COMMERCIAL

## 2019-07-08 VITALS
OXYGEN SATURATION: 97 % | SYSTOLIC BLOOD PRESSURE: 122 MMHG | TEMPERATURE: 98.7 F | DIASTOLIC BLOOD PRESSURE: 72 MMHG | BODY MASS INDEX: 33.74 KG/M2 | HEIGHT: 67 IN | WEIGHT: 215 LBS | HEART RATE: 82 BPM

## 2019-07-08 DIAGNOSIS — R12 HEART BURN: ICD-10-CM

## 2019-07-08 DIAGNOSIS — I10 ESSENTIAL HYPERTENSION: ICD-10-CM

## 2019-07-08 DIAGNOSIS — E66.09 CLASS 1 OBESITY DUE TO EXCESS CALORIES WITHOUT SERIOUS COMORBIDITY WITH BODY MASS INDEX (BMI) OF 33.0 TO 33.9 IN ADULT: ICD-10-CM

## 2019-07-08 DIAGNOSIS — Z79.899 LONG TERM USE OF DRUG: ICD-10-CM

## 2019-07-08 DIAGNOSIS — E78.2 MIXED HYPERLIPIDEMIA: ICD-10-CM

## 2019-07-08 PROCEDURE — 99214 OFFICE O/P EST MOD 30 MIN: CPT | Performed by: INTERNAL MEDICINE

## 2019-07-08 RX ORDER — FENOFIBRATE 160 MG/1
160 TABLET ORAL DAILY
Qty: 90 TAB | Refills: 3 | Status: SHIPPED | OUTPATIENT
Start: 2019-07-08 | End: 2020-06-25 | Stop reason: SDUPTHER

## 2019-07-09 NOTE — ASSESSMENT & PLAN NOTE
Most recent cholesterol is 224 with triglycerides 423 and HDL 32.  He has been trying to follow appropriate diet with some success.  He has not really been exercising very much.  He has been taking fish oil tablets daily.  He is anxious to get his triglycerides down.  He has not had any pancreatitis type symptoms.  He reports that the earliest someone has had heart trouble in his family has been around age 60.

## 2019-07-09 NOTE — ASSESSMENT & PLAN NOTE
Not yet he is trying to follow a low-salt diet.  He has not been able to lose much weight.  He denies lightheadedness or headaches.  Blood pressure today is quite satisfactory.

## 2019-07-09 NOTE — ASSESSMENT & PLAN NOTE
He has had occasional mild heartburn or gastroesophageal reflux symptoms.  These have been fairly mild.

## 2019-07-09 NOTE — PROGRESS NOTES
Subjective:     Chief Complaint   Patient presents with   • Follow-Up     1 month    • Labs Only     Al Corcoran is a 49 y.o. male here today for follow-up especially of his hyper triglyceridemia along with hypertension and obesity and heartburn.    Essential hypertension  Not yet he is trying to follow a low-salt diet.  He has not been able to lose much weight.  He denies lightheadedness or headaches.  Blood pressure today is quite satisfactory.    Mixed hyperlipidemia  Most recent cholesterol is 224 with triglycerides 423 and HDL 32.  He has been trying to follow appropriate diet with some success.  He has not really been exercising very much.  He has been taking fish oil tablets daily.  He is anxious to get his triglycerides down.  He has not had any pancreatitis type symptoms.  He reports that the earliest someone has had heart trouble in his family has been around age 60.    Heart burn  He has had occasional mild heartburn or gastroesophageal reflux symptoms.  These have been fairly mild.    Class 1 obesity due to excess calories without serious comorbidity with body mass index (BMI) of 33.0 to 33.9 in adult  He remains significantly overweight with BMI 33.67.  Weight is down 2 pounds from a month ago.  He has not really gotten into more of an exercise program yet.       Diagnoses of Mixed hyperlipidemia, Essential hypertension, Long term use of drug, Heart burn, and Class 1 obesity due to excess calories without serious comorbidity with body mass index (BMI) of 33.0 to 33.9 in adult were pertinent to this visit.    Allergies: Peanuts [peanut oil]  Current medicines (including changes today)  Current Outpatient Prescriptions   Medication Sig Dispense Refill   • fenofibrate (TRIGLIDE) 160 MG tablet Take 1 Tab by mouth every day. 90 Tab 3   • ibuprofen (MOTRIN) 800 MG Tab Take 800 mg by mouth every 8 hours as needed.       No current facility-administered medications for this visit.        He  has a past  "medical history of Class 1 obesity due to excess calories without serious comorbidity with body mass index (BMI) of 33.0 to 33.9 in adult (6/3/2019); Dyspnea on exertion (6/3/2019); Essential hypertension (6/3/2019); Heart burn (6/3/2019); High cholesterol; High triglycerides; Hypertension; Lightheaded (6/3/2019); and Mixed hyperlipidemia (6/3/2019).    ROS    Patient denies significant change in strength, weight or appetite.  No significant lightheadedness or headaches.  No change in vision, hearing, or swallowing.  No new dyspnea, coughing, chest pain, or palpitations.  No indigestion, abdominal pain, or change in bowel habits.  He does have occasional mild heartburn as noted.  No change in urinating.  No new ankle swelling.       Objective:     PE:  /72 (BP Location: Left arm, Patient Position: Sitting)   Pulse 82   Temp 37.1 °C (98.7 °F)   Ht 1.702 m (5' 7\")   Wt 97.5 kg (215 lb)   SpO2 97%   BMI 33.67 kg/m²    Neck is supple without significant lymphadenopathy or masses.  Lungs are clear with normal breath sounds without wheezes or rales .  Cardiovascular: peripheral circulation is satisfactory, heart sounds are unchanged and unremarkable.  Abdomen is soft, without masses or tenderness, with normal bowel sounds.  Extremities are without significant edema, cyanosis or deformity.      Assessment and Plan:   The following treatment plan was discussed  1. Mixed hyperlipidemia  Lipid Profile    We again reviewed appropriate diet.  We will start him on fenofibrate.  We reviewed potential side effects.  He will work at weight loss and exercise.   2. Essential hypertension      Continue low-salt diet.  He was encouraged to check his blood pressure at home and report that to us.   3. Long term use of drug  HEPATIC FUNCTION PANEL    We will check a hepatic panel but I doubt any problems.   4. Heart burn      We again reviewed an antireflux program including especially weight loss.   5. Class 1 obesity due to " excess calories without serious comorbidity with body mass index (BMI) of 33.0 to 33.9 in adult      He will work at a weight loss program with appropriate diet and exercise.       Followup: Return in about 3 months (around 10/8/2019) for Short.

## 2019-07-09 NOTE — ASSESSMENT & PLAN NOTE
He remains significantly overweight with BMI 33.67.  Weight is down 2 pounds from a month ago.  He has not really gotten into more of an exercise program yet.

## 2019-10-03 ENCOUNTER — IMMUNIZATION (OUTPATIENT)
Dept: OCCUPATIONAL MEDICINE | Facility: CLINIC | Age: 49
End: 2019-10-03

## 2019-10-03 DIAGNOSIS — Z23 NEED FOR VACCINATION: ICD-10-CM

## 2019-10-03 PROCEDURE — 90686 IIV4 VACC NO PRSV 0.5 ML IM: CPT | Performed by: NURSE PRACTITIONER

## 2019-10-07 ENCOUNTER — OFFICE VISIT (OUTPATIENT)
Dept: MEDICAL GROUP | Facility: MEDICAL CENTER | Age: 49
End: 2019-10-07
Payer: COMMERCIAL

## 2019-10-07 VITALS
OXYGEN SATURATION: 98 % | HEART RATE: 87 BPM | DIASTOLIC BLOOD PRESSURE: 80 MMHG | WEIGHT: 213 LBS | SYSTOLIC BLOOD PRESSURE: 128 MMHG | HEIGHT: 67 IN | TEMPERATURE: 97.5 F | BODY MASS INDEX: 33.43 KG/M2

## 2019-10-07 DIAGNOSIS — R12 HEART BURN: ICD-10-CM

## 2019-10-07 DIAGNOSIS — E78.2 MIXED HYPERLIPIDEMIA: ICD-10-CM

## 2019-10-07 DIAGNOSIS — I10 ESSENTIAL HYPERTENSION: ICD-10-CM

## 2019-10-07 DIAGNOSIS — E66.09 CLASS 1 OBESITY DUE TO EXCESS CALORIES WITHOUT SERIOUS COMORBIDITY WITH BODY MASS INDEX (BMI) OF 33.0 TO 33.9 IN ADULT: ICD-10-CM

## 2019-10-07 DIAGNOSIS — R13.14 PHARYNGOESOPHAGEAL DYSPHAGIA: ICD-10-CM

## 2019-10-07 PROCEDURE — 99214 OFFICE O/P EST MOD 30 MIN: CPT | Performed by: INTERNAL MEDICINE

## 2019-10-07 NOTE — PROGRESS NOTES
Subjective:     Chief Complaint   Patient presents with   • Follow-Up     Difficulty swallowing with food / getting worse    • Hyperlipidemia     Al Corcoran is a 49 y.o. male here today for evaluation especially of swallowing difficulty as well as follow-up of his hypertension and heartburn and obesity.    Essential hypertension  Elevated blood pressures under good control with primarily low-salt diet.    Mixed hyperlipidemia  He is tolerating the fenofibrate and he is taking fish oil tablets.  He does not have a time scheduled for follow-up of that.  Weight remains a problem.    Heart burn  He has heartburn that he treats with taking Tums.  Heartburn he thinks is separate from his swallowing difficulty.  The heartburn is unchanged recently.  The discomfort is more in the mid to lower chest.    Class 1 obesity due to excess calories without serious comorbidity with body mass index (BMI) of 33.0 to 33.9 in adult  He remains significantly overweight with BMI 33.36.  Weight is down 2 pounds from July.    Pharyngoesophageal dysphagia  He reports of difficulty swallowing that seems to be in the mid neck region and is improved if he pushes on the anterior part of the mid neck.  This is gradually getting worse.  No one particular type of food is worse than another.  Liquids do not seem to cause the problem.  He has had no recent change in his voice.  He does note that when he tries to eat food, this often causes a coughing response.  He thinks that his swallowing problem is improved if he flexes his neck.       Diagnoses of Pharyngoesophageal dysphagia, Mixed hyperlipidemia, Class 1 obesity due to excess calories without serious comorbidity with body mass index (BMI) of 33.0 to 33.9 in adult, Heart burn, and Essential hypertension were pertinent to this visit.    Allergies: Peanuts [peanut oil]  Current medicines (including changes today)  Current Outpatient Medications   Medication Sig Dispense Refill   • fenofibrate  "(TRIGLIDE) 160 MG tablet Take 1 Tab by mouth every day. 90 Tab 3   • ibuprofen (MOTRIN) 800 MG Tab Take 800 mg by mouth every 8 hours as needed.       No current facility-administered medications for this visit.        He  has a past medical history of Class 1 obesity due to excess calories without serious comorbidity with body mass index (BMI) of 33.0 to 33.9 in adult (6/3/2019), Dyspnea on exertion (6/3/2019), Essential hypertension (6/3/2019), Heart burn (6/3/2019), High cholesterol, High triglycerides, Hypertension, Lightheaded (6/3/2019), and Mixed hyperlipidemia (6/3/2019).  Health Maintenance: If he has to have upper endoscopy, this would be a great time for a colonoscopy.  We reviewed the importance of getting a colonoscopy.  ROS    Patient denies significant change in strength, weight or appetite.  No significant lightheadedness or headaches.  No change in vision, hearing, or swallowing except as noted above with the swallowing difficulty.  No new dyspnea, coughing, chest pain, or palpitations except for some mild coughing often related to swallowing..  No indigestion, abdominal pain, or change in bowel habits except for his heartburn symptoms that he reports are unchanged and do not seem related to his swallowing difficulty.  No change in urinating.  No new ankle swelling.       Objective:     PE:  /80 (BP Location: Right arm, Patient Position: Sitting)   Pulse 87   Temp 36.4 °C (97.5 °F)   Ht 1.702 m (5' 7\")   Wt 96.6 kg (213 lb)   SpO2 98%   BMI 33.36 kg/m²    Neck is supple without significant lymphadenopathy or masses.  Lungs are clear with normal breath sounds without wheezes or rales .  Cardiovascular: peripheral circulation is satisfactory, heart sounds are unchanged and unremarkable.  Abdomen is soft, without masses or tenderness, with normal bowel sounds.  Extremities are without significant edema, cyanosis or deformity.      Assessment and Plan:   The following treatment plan was " discussed  1. Pharyngoesophageal dysphagia  REFERRAL TO SPEECH THERAPY Reason for Therapy: Eval/Treat/Report    We will get a swallowing evaluation and consider further evaluation or treatment depending on those results.   2. Mixed hyperlipidemia  Lipid Profile    Continue same medication.  We again reviewed appropriate diet and importance of weight loss.   3. Class 1 obesity due to excess calories without serious comorbidity with body mass index (BMI) of 33.0 to 33.9 in adult      He was encouraged in a weight loss program including appropriate diet and exercise.  Aside from other advantages, this should help his heartburn.   4. Heart burn      We reviewed appropriate conservative therapy.  He might be a good candidate for regular Pepcid Complete or perhaps Prilosec.  He may need upper endoscopy to further elucidate his problem.   5. Essential hypertension      Continue low-salt diet and work at weight loss and exercise.       Followup: Return in about 3 months (around 1/7/2020) for Short.

## 2019-10-07 NOTE — ASSESSMENT & PLAN NOTE
He is tolerating the fenofibrate and he is taking fish oil tablets.  He does not have a time scheduled for follow-up of that.  Weight remains a problem.

## 2019-10-07 NOTE — ASSESSMENT & PLAN NOTE
He reports of difficulty swallowing that seems to be in the mid neck region and is improved if he pushes on the anterior part of the mid neck.  This is gradually getting worse.  No one particular type of food is worse than another.  Liquids do not seem to cause the problem.  He has had no recent change in his voice.  He does note that when he tries to eat food, this often causes a coughing response.  He thinks that his swallowing problem is improved if he flexes his neck.

## 2019-10-07 NOTE — ASSESSMENT & PLAN NOTE
He has heartburn that he treats with taking Tums.  Heartburn he thinks is separate from his swallowing difficulty.  The heartburn is unchanged recently.  The discomfort is more in the mid to lower chest.

## 2019-10-14 ENCOUNTER — SPEECH THERAPY (OUTPATIENT)
Dept: SPEECH THERAPY | Facility: REHABILITATION | Age: 49
End: 2019-10-14
Attending: INTERNAL MEDICINE
Payer: COMMERCIAL

## 2019-10-14 ENCOUNTER — TELEPHONE (OUTPATIENT)
Dept: SPEECH THERAPY | Facility: REHABILITATION | Age: 49
End: 2019-10-14

## 2019-10-14 DIAGNOSIS — R13.14 DYSPHAGIA, PHARYNGOESOPHAGEAL PHASE: ICD-10-CM

## 2019-10-14 PROCEDURE — 92610 EVALUATE SWALLOWING FUNCTION: CPT

## 2019-10-14 ASSESSMENT — ENCOUNTER SYMPTOMS: NO PATIENT REPORTED PAIN: 1

## 2019-10-14 NOTE — OP THERAPY EVALUATION
Outpatient Speech Therapy  INITIAL EVALUATION    [facility] [address]  [address]  [place] [address]  Phone:  [phone]  Fax:  [phone]    Date of Evaluation: 10/14/2019    Patient: Al Corcoran  YOB: 1970  MRN: [id number]     Referring Provider: Solo Omer M.D.  [address] [place] [address] [address]  [place] [address]   Referring Diagnosis Pharyngoesophageal dysphagia [R13.14]     Time Calculation  Start time: 1330  Stop time: 1414 Time Calculation (min): 44 minutes       Speech Therapy Occurrence Codes    Date of Onset of Impairment:  8/14/19   Date speech therapy care plan established or reviewed:  10/14/19   Date speech therapy treatment started:  10/14/19          Chief Complaint: Food Stuck In Throat; Dysphagia; and Difficulty Swallowing    Visit Diagnoses     ICD-10-CM   1. Dysphagia, pharyngoesophageal phase R13.14     Subjective:   Reason for Therapy:     Reason For Evaluation:  Dysphagia    Onset Description:  Pre MD visit on 10/7: He reports of difficulty swallowing that seems to be in the mid neck region and is improved if he pushes on the anterior part of the mid neck.  This is gradually getting worse.  No one particular type of food is worse than another.  Liquids do not seem to cause the problem.  He has had no recent change in his voice.  He does note that when he tries to eat food, this often causes a coughing response.  He thinks that his swallowing problem is improved if he flexes his neck.    3/25/11 had Head CT and CT of neck which were WFL.  Also had esophagram which reported dysmotility and left deviation of esophagus possibly to flexion with swallowing or R mass.   Social Support:     Patient Mental Status:  Alert and Responsive  Progress Factors:     Progression:  Getting worse  Pain:     no pain reported    Therapy History:     Current Diet:  Regular Diet and Thin Liquids    Hearing:  No Deficits    Vision:  No Deficits    Dentition:   "Complete Dentition  Additional Subjective Comments:      Patient reported significant increase with swallowing deficits over last 1-2 months.  Reported has outside factors causing stress, but does not relate this to time when swallowing had changed.    Reported a globus feeling which then triggers \"panic\" with swallowing.  Stated he had just eaten over an hour ago and still clearing his throat after.  Pointed to sternal notch were he reported he pushes on this to alleviate some of the globus feeling near the laryngeal area.   Reported coughing can last up to 10 minutes, but depends on what he is eating and the stress. Stated he is using an effortful swallow consistently to get food down, but this is not successful.  Reported using a chin tuck with medications and this is not successful as well.  Stated there is resistance with PO intake like there is a\"lump\".  He has not lost weight or stopped consuming items due to this, but it has significantly impacted his quality of life.    Past Medical History:   Diagnosis Date   • Class 1 obesity due to excess calories without serious comorbidity with body mass index (BMI) of 33.0 to 33.9 in adult 6/3/2019   • Dyspnea on exertion 6/3/2019   • Essential hypertension 6/3/2019   • Heart burn 6/3/2019   • High cholesterol    • High triglycerides    • Hypertension    • Lightheaded 6/3/2019   • Mixed hyperlipidemia 6/3/2019     Past Surgical History:   Procedure Laterality Date   • VASECTOMY       Objective:   Treatments/Interventions Performed:  Dysphagia treatment, Patient/Caregiver education, Home program and Compensatory strategy training  Treatment Intervention tool(s) used:  Eating assessment Tool (EAT-10), Swallowing Ability and Function Evaluation (SAFE), [place] Swallow Protocol      Objective Details:  EAT-10 scored a 30 which scores of 3 and above represent how swallowing may be impacted with efficiency and safety- severely impacting QOL.    SAFE scores were the following: " "Physical Examination and Oral phase of swallow WFL, Pharyngeal Phase of swallowing in severe limits.  Failed the Alejandra swallow protocol a unable to consume 3 oz of water at the same time. Attempted, but had to take breaks.  Patient stated it was too difficult.  Patient attempted to complete the following swallowing Exercises with a model: Shaker, Mendelson, Fariba, and breath hold.  Would go into coughing episodes as \"something is in the way\".    Speech Therapy Assessment:     Speech Mechanism Assessment:     Patient voice description: Clear    Patient's oral movements are voluntary and coordination: WFL    Patient speaks fluently: WFL    Patient exhibits articulatory precision: WFL    Patient exhibits conversational intelligibility: WFL    Nasality is not nasal and within functional limits    Patient uses adequate breath support: Yes    Patient breath rate: Normal  Speech mechanism comments: Sustained \"ah\" for 23 seconds.    Oral Motor Status:     Labial strength and control for patient: Good    Lingual strength and control for patient: Good    Patient saliva management: GoodPatient oral sensation and awareness: Good    Patient awareness of swallow problem: Good    Previous modified barium swallow: No    Oral Phase Assessment:     Types of food within functional limits: Puree, Mechanical Soft and Thin Liquid    Pharyngeal Phase Assessment:     Delayed Swallow    Food types within functional limits: Puree, Mechanical Soft and Thin Liquid    Laryngeal    Reduced laryngeal elevation: Puree, Mechanical Soft and Thin Liquid    Coughing after the swallow present: Mechanical Soft    Delayed coughing seconds (Mechanical Soft): 5 seconds.    Pharyngeal phase comments: Mild to moderate decreased hyolaryngeal elevation.  Adequate oral motor function otherwise. Patient had s/sx of aspiration/penetration with mixed viscosities.  All prep, transit, and initiation times WFL. Patient demonstrated small intake and adequate rate.  He " "reported that all the viscosities were effortful and felt resistance.  Attempted chin tuck, multiple swallows, head turn left and right with check tuck, supraglottic swallow, effortful swallow, liquid wash and all were unsuccessful with clearing out reported pharyngeal residue (possible vallecular area).  When attempted he would state each strategy would \"trigger\" him to go into coughing fits that would last a few seconds and couldn't attempt any more trials.  Solids were not trialed due to the difficulty with mixed.  Patient felt this was the worse he would have been for assessment.  [place] that if he hadn't eaten (over an hour ago) he wouldn't have had so much difficulty.        Speech Therapy Plan :   Prognosis & Recommendations  Impression Summary: Al Corcoran is a 49 year old male who was evaluated for pharyngeal dysphagia.  He has has increased swallowing deficits especially related to solids over the past 1-2 months that have significantly decreased his quality of life.    Patient found to have pharyngeal dysphagia characterized by decreased hyolaryngeal elevation, pharyngeal residue and inability to clear, and coughing after swallow.    Patient recommended for further testing to r/o causes and determine safety and least restrictive diet.  He is presenting worse bedside than what would be expected with his current abilities.    Prognosis:  Good  Prognosis Details:  Patient is highly motivated to return to his PLOF and wants to complete whatever is needed to get back to that.  Compensatory swallow strategies:  Alternate liquids/solids, Reduce bolus size, Upright position 90 degrees during meal and 45 minutes after meal, Multiple swallows and No talking while eating  Diet Recommendation:  Pureed Foods  Liquid Recommendation:  Thin Liquid  Medication Administration:  Floated/crushed to ease consumption  Goals  Short Term Goals:  Patient to complete swallowing exercise program daily to 100% accuracy to improve " hyolaryngeal elevation and overall swallowing function.  Complete Modified Barium Swallow Study to r/o obstruction and determine least restrictive and safest means for nutrition/hydration.    Short Term Goal Duration (Weeks):  4-6 weeks  Long Term Goals:  Patient to consume the least restrictive diet to maintain adequate nutrition/hydration with out s/sx of aspiration to 100%.  Eat 10 scores to be between a 3 and 7 for improved quality of life.    Long Term Goal Duration (Weeks):  2-4 months  Patient Stated Goal:  Want to not cough after eating  Potential barriers to Goal Achievement:  None  Therapy Recommendations  Recommendation:  Modified Barium Swallow Study, Individual Speech Therapy and Other, Possibility of a neck CT to rule out mass if seen on MBSS.  Planned Therapy Interventions:  Swallow Dysfunction treatment, Dysphagia treatment, Home Program and Patient/Caregiver Education,   Duration (in visits):  8  Duration (in weeks):  8      Referring provider co-signature:  I have reviewed this plan of care and my co-signature certifies the need for services.  Certification Dates:   From 10/14/19     To 12/10/19    Physician Signature: ________________________________ Date: ______________

## 2019-10-23 ENCOUNTER — TELEPHONE (OUTPATIENT)
Dept: SPEECH THERAPY | Facility: REHABILITATION | Age: 49
End: 2019-10-23

## 2019-10-23 ENCOUNTER — SPEECH THERAPY (OUTPATIENT)
Dept: SPEECH THERAPY | Facility: REHABILITATION | Age: 49
End: 2019-10-23
Attending: INTERNAL MEDICINE
Payer: COMMERCIAL

## 2019-10-23 DIAGNOSIS — R13.14 PHARYNGOESOPHAGEAL DYSPHAGIA: ICD-10-CM

## 2019-10-23 DIAGNOSIS — R42 VERTIGO: ICD-10-CM

## 2019-10-23 DIAGNOSIS — R13.19 ESOPHAGEAL DYSPHAGIA: ICD-10-CM

## 2019-10-23 DIAGNOSIS — R13.14 DYSPHAGIA, PHARYNGOESOPHAGEAL PHASE: ICD-10-CM

## 2019-10-23 PROCEDURE — 92526 ORAL FUNCTION THERAPY: CPT

## 2019-10-23 NOTE — OP THERAPY DAILY TREATMENT
"  Outpatient Speech Therapy  DAILY TREATMENT     [facility] [address]  [address]  [place] [address]  Phone:  [phone]  Fax:  [phone]    Date: 10/23/2019    Patient: Al Corcoran  YOB: 1970  MRN: [id number]     Time Calculation  Start time: 1330  Stop time: 1402 Time Calculation (min): 32 minutes       Chief Complaint: Dysphagia    Visit #: 2    Subjective:   Reason for Therapy:     Reason For Evaluation:  Dysphagia  Social Support:     Patient Mental Status:  Alert and Responsive  Progress Factors:     Progression:  Staying the same  Additional Subjective Comments:      Not able to complete HEP as often as able.  Stated he just forgets when swallowing is doing well.  Completed about every other day.  Ate about a hour ago, and still clearing and coughing.  Reported it takes 2-3 hours after a meal before this isn't an issue anymore.  Patient reported that he is taking longer to eat and taking smaller bites. However, he is not modifying solids as he \"is not ready for that.\" Only using alternating liquids and solids when he feels it's dry.   Patient feeling food backing up internally.  Reported increased nausea and burning with stomach over the last week.  Dizziness is preventing him from completing one of the recommended swallowing exercises.  Agreed that since the dizziness is not improving he will address it.  SLP to notify PCP.        Objective:   Treatments/Interventions Performed:  Patient/Caregiver education, Dysphagia treatment, Home program, Compensatory strategy training and Speech/Language treatment  Objective Details:  Shaker exercise is making him feel sick due to \"inner ear/dizziness problem\" not due to inability of physical limitations.  Mendelson completed and patient reported it felt \"dry and tight and felt the lump (right above sternal notch) blocking, and it was effortful\".  Completed with no assist 1 set of 10. Continues to have decreased " "hyolaryngeal elevation. Noted tongue pumping as well with trying to complete. Patient stated he \"felt that if food wasn't in his stomach it would be easier\", however he has not tried the exercises on an empty stomach to verify that observation.  Patient started clearing throat more after completion of Mendelson.   Fariba exercise completed with significant effort where patient was grunting during swallow.  Patient reported \"feeling like I am trying to get something up\".  Needed to have drinks of water every couple of swallows due to xerostomia. Only able to complete 5 repetitions before coughing/clearing was too problematic. Breath hold completed with no complications, however patient reported the \"lump\" when swallowing.    Intake of thin with coughing after. Voice changes.  Patient stated this lasts up to 2-3 hours after PO intake.    Assisted with scheduling MBSS. Set for 10/29 @315.          Speech Therapy Assessment:     Pharyngeal Phase Assessment:     Laryngeal    Reduced laryngeal elevation: Thin Liquid    Coughing after the swallow present: Thin Liquid    Pharyngeal phase comments: Increased coughing as time increased after PO intake ceased.  Patient able to participate more in tx this session with less coughing triggers, but still excessive.  Patient felt that if he didn't eat before coming then it wouldn't be \"so bad\".  SLP asked patient for next session to have food to eat after complete exercises to compare.  Also directed patient to set alarm to complete exercises in am before PO intake to see if can complete easier or not.      Speech Therapy Plan :   Prognosis & Recommendations  Impression Summary:  Al Corcoran is a 49 year old male who was seen for follow up for pharyngeal dysphagia.     Patient with slight improvements with ability to participate in swallow exercises this session in comparison to last.  MBSS scheduled 10/29 to further assess dysphagia.      It takes significant effort for " patient to complete exercises and is impacted secondarily by nausea, coughing, and dizziness.  Patient to start addressing other symptoms as swallowing is being addressed.       Compensatory swallow strategies:  Alternate liquids/solids, Reduce bolus size, Upright position 90 degrees during meal and 45 minutes after meal, Multiple swallows and No talking while eating  Diet Recommendation:  Pureed Foods  Liquid Recommendation:  Thin Liquid  Medication Administration:  Floated/crushed to ease consumption  Goals  Short Term Goals:  Patient to complete swallowing exercise program daily to 100% accuracy to improve hyolaryngeal elevation and overall swallowing function.  Complete Modified Barium Swallow Study to r/o obstruction and determine least restrictive and safest means for nutrition/hydration.    Therapy Recommendations  Recommendation:  Individual Speech Therapy and Other, ENT assessment for dizziness/vertigo and GI assessment for esophageal dysphagia/deficits.  Planned Therapy Interventions:  Swallow Dysfunction treatment, Dysphagia treatment, Home Program and Patient/Caregiver Education,

## 2019-10-24 ENCOUNTER — TELEPHONE (OUTPATIENT)
Dept: MEDICAL GROUP | Facility: MEDICAL CENTER | Age: 49
End: 2019-10-24

## 2019-10-24 NOTE — TELEPHONE ENCOUNTER
Karine with [employer] called in regards to patients referral: [id number]. She said it says that the order has been cancelled but to me it looks like he does not need authorization for this referral. I called karine back and left a voicemail as well as the referral department with [employer] to see what was going on and if we need to resend the referral.     Karine: [phone]

## 2019-10-29 ENCOUNTER — HOSPITAL ENCOUNTER (OUTPATIENT)
Dept: RADIOLOGY | Facility: MEDICAL CENTER | Age: 49
End: 2019-10-29
Attending: INTERNAL MEDICINE
Payer: COMMERCIAL

## 2019-10-29 DIAGNOSIS — R13.14 PHARYNGOESOPHAGEAL DYSPHAGIA: ICD-10-CM

## 2019-10-29 PROCEDURE — 92611 MOTION FLUOROSCOPY/SWALLOW: CPT

## 2019-10-29 PROCEDURE — 74230 X-RAY XM SWLNG FUNCJ C+: CPT

## 2019-10-29 NOTE — OP THERAPY EVALUATION
[facility]  Speech-Language Pathology Department  Modified Barium Swallow Study Summary    Patient:  Al Corcoran    Date of Evaluation: 10/29/19    Referring Provider:  Solo Omer MD    Radiologist:  Dr. Frank    Patient History/Reason for Referral: Pt was referred for MBS due to   R13.12 oropharyngeal dysphagia. Per chart review patient with ongoing progressive swallowing difficulties. Per MD visit on 10/7: He reports of difficulty swallowing that seems to be in the mid neck region and is improved if he pushes on the anterior part of the mid neck.  This is gradually getting worse.  No one particular type of food is worse than another.  Liquids do not seem to cause the problem.  He has had no recent change in his voice.  He does note that when he tries to eat food, this often causes a coughing response.  He thinks that his swallowing problem is improved if he flexes his neck.     3/25/11 had Head CT and CT of neck which were WFL.  Also had esophagram which reported dysmotility and left deviation of esophagus possibly to flexion with swallowing     Patient reported significant increase with swallowing deficits over last 1-2 months Stated he had eaten lunch this date and still clearing his throat after.  Pointed to sternal notch were he reported he pushes on this to alleviate some of the globus feeling near the laryngeal area.   Reported coughing can last up to 10 minutes, but depends on what he is eating and the stress. Stated he is using an effortful swallow consistently to get food down, but this is not successful.    Patient is seeing Didi Faria SLP for outpatient SLP services to address dysphagia.     Oral Mechanism Exam:   -Dentition: WFL  -Labial: WFL  -Lingual: WFL  -Palatal Elevation: WFL  -Laryngeal Elevation: WFL  -Cough: WFL  -Vocal Quality: WFL  - Circumlaryngeal Palpation: WFL     Procedure Results:  The examination was conducted with videofluoroscopy from a   Lateral  view.  The following textures were presented:   Pudding, Cookie, Thin Liquid and Nectar Thick Liquid     Structural Abnormalities:     The following observations were made:   (WFL unless otherwise noted)    Oral Phase Thin Liquids  Thick Liquids Puree Solid   Lip Closure       Tongue Control During Bolus Hold       Premature spillage into the valleculae       Premature spillage into the pyriform sinus       Bolus Preparation/ Mastication       Bolus Transport/ Lingual Motion       Oral Residue after swallow       Initiation of Pharyngeal Swallow          Other Observations:            Pharyngeal Phase Thin Liquids  Thick Liquids Puree Solid   Soft Palate Elevation       Laryngeal Elevation       Anterior Hyoid Excursion       Epiglottic Inversion        Laryngeal Vestibular Closure       Pharyngeal Stripping Wave       Pharyngoesophageal Segment Opening (PES) X  X X   Tongue Base Retraction (BOT) and/or BOT Residue X  X X   Residue in valleculae X trace      Residue in piriform sinus(es) X trace       Residue on posterior pharyngeal wall (PPW)   X trace    Penetration       Aspiration         Other Observations:      Penetration Aspiration Scale:   Score of 1: Neither penetration nor aspiration  Score of 2-5: Penetration  Score of 6-8: Aspiration    1: Material does not enter airway  2: Material enters airway, but remains above vocal folds; Ejected from airway; no stasis  3: Material remains above vocal folds; visible stasis remains  4: Material contacts vocal folds, but is ejected; no stasis  5: Material contacts vocal folds, and is not ejected; visible stasis remains  6: Material passes glottis, but is ejected from airway; No visible subglottic stasis  7: Material passes glottis, but is not ejected from airway; visible subglottic stasis despite patient’s response  8: Material passes glottis, and is not ejected; visible subglottic stasis; Absent patient response                          Esophageal Phase: PES  residue/retention                                 Impressions:  Patient with no aspiration or penetration appreciated during this study. Oral phase WFL. Pharyngeal phase was characterized by reduced PES opening, reduced BOT retraction, trace residue in valleculae and pyriform sinuses during thin, purees, and solids. Alone with trace residue on PPW, second swallow liquid wash helped clear this. PES residue and retention noted.       Recommendations: Diet:     Regular        Liquid:     thins                                        Medications:     As tolerated        Compensatory Strategies:   Upright for all meals, alternate between liquids and solids, double swallow.         Your patient may benefit from a referral for:      1). Continue outpatient SLP services to address dysphagia.    2). Consider GI consult retention and residue noted at PES further assessment indicated for esophageal dysphagia.             Thank you for the referral.    For further questions, please call [phone].        Glenys Taveras M.S., CCC-SLP

## 2019-11-04 ENCOUNTER — SPEECH THERAPY (OUTPATIENT)
Dept: SPEECH THERAPY | Facility: REHABILITATION | Age: 49
End: 2019-11-04
Attending: INTERNAL MEDICINE
Payer: COMMERCIAL

## 2019-11-04 DIAGNOSIS — R13.14 DYSPHAGIA, PHARYNGOESOPHAGEAL PHASE: ICD-10-CM

## 2019-11-04 PROCEDURE — 92507 TX SP LANG VOICE COMM INDIV: CPT

## 2019-11-04 NOTE — OP THERAPY DAILY TREATMENT
"  Outpatient Speech Therapy  DAILY TREATMENT     [facility] [address]  [address]  [place] [address]  Phone:  [phone]  Fax:  [phone]    Date: 11/04/2019    Patient: Al Corcoran  YOB: 1970  MRN: [id number]     Time Calculation  Start time: 0931  Stop time: 0958 Time Calculation (min): 27 minutes       Chief Complaint: Difficulty Swallowing    Visit #: 3    Subjective:   Reason for Therapy:     Reason For Evaluation:  Dysphagia  Social Support:     Patient Mental Status:  Alert and Responsive  Progress Factors:     Progression:  Getting worse  Additional Subjective Comments:      Patient reported that exhausted by the end of the day.  Reported more frequent coughing episodes. Has apt with GI 18th Nov.  Episodes are lasting a couple of hours.  Last night he had pork lion, carrots, asparagus, Amarjit salad, and mashed potatoes with no issues.  Had breakfast 11 hours prior which also had no issues.  Very rare this happens.  Had consumed pancakes, eggs, potatoes, biscuits and gravy, sausage, and mckeon with no issues as well.  Noted globus sensation up to 1 hour after that meal.   Patient recalled not to eat before session so he could complete HEP.      Objective:   Treatments/Interventions Performed:  Patient/Caregiver education, Dysphagia treatment, Home program, Compensatory strategy training and Speech/Language treatment  Objective Details:  Shaker exercise reported he is completed 4-5 reps before feeling sick due to \"inner ear/dizziness problem\" not due to inability of physical limitations.    Mendelson and [place]ba completed 1 set of 5 reps each.  Continued to need drinks of water every couple of swallows due to xerostomia.  No coughing or clearing with all exercises.  Introduced Gargle, which he could only do for about 2 seconds.  SLP encouraged increased length up to 5 seconds as able.  Tongue pull packs completed with min assist for correct completion.  " "Lingual resistance taught and completed to 100%.   Yawn taught to hold and resist a \"real\" one if able, but if not he could complete with just holding mandible open for 5 seconds to engage muscles.  Globus sensation mention a few times t/o these exercises, but didn't linger and was mild.    Intake of thin with no coughing after. No voice changes.      Patient not implementing alternating liquid and solids as this  \"throws off his taste buds\", but does use liquid wash when necessary.           Speech Therapy Assessment:     Oral Motor Status:     Patient awareness of swallow problem: Good    Previous modified barium swallow: Yes    Date of previous modified barium swallow: 10/29/2019    Oral motor status comments: MBSS Impressions:  Patient with no aspiration or penetration appreciated during this study. Oral phase WFL. Pharyngeal phase was characterized by reduced PES opening, reduced BOT retraction, trace residue in valleculae and pyriform sinuses during thin, purees, and solids. Alone with trace residue on PPW, second swallow liquid wash helped clear this. PES residue and retention noted.     Recommendations:  Diet:     Regular                                          Liquid:     thins                                         Medications:     As tolerated    Compensatory Strategies:   Upright for all meals, alternate between liquids and solids, double swallow.      Your patient may benefit from a referral for:                 1). Continue outpatient SLP services to address dysphagia.               2). Consider GI consult retention and residue noted at PES further assessment indicated for esophageal dysphagia.        Oral Phase Assessment:     Types of food within functional limits: Thin Liquid    Pharyngeal Phase Assessment:     Laryngeal    Reduced laryngeal elevation: Thin Liquid    Coughing after the swallow present: Thin Liquid    Pharyngeal phase comments: Patient recognized that he is able to completed HEP " tasks with success before PO intake.  Once PO intake has begun, there is significant difficulty.      Speech Therapy Plan :   Prognosis & Recommendations  Impression Summary: Al Corcoran is a 49 year old male who was seen for follow up for pharyngeal dysphagia.     MBSS completed. Patient with esophageal deficits that need to be addressed.  Symptoms continue to worsen.  Patient introduced to new exercises related to PES and BOT to improve function that was noted to be decreased on formal assessment.     Prognosis:  Excellent  Compensatory swallow strategies:  Alternate liquids/solids, Reduce bolus size, Upright position 90 degrees during meal and 45 minutes after meal, Multiple swallows and No talking while eating  Diet Recommendation:  Pureed Foods  Liquid Recommendation:  Thin Liquid  Medication Administration:  Floated/crushed to ease consumption  Goals  Short Term Goals:  Patient to complete swallowing exercise program daily to 100% accuracy to improve hyolaryngeal elevation and overall swallowing function.  Complete Modified Barium Swallow Study to r/o obstruction and determine least restrictive and safest means for nutrition/hydration. MET    Therapy Recommendations  Recommendation:  Individual Speech Therapy,  Planned Therapy Interventions:  Swallow Dysfunction treatment, Dysphagia treatment, Home Program and Patient/Caregiver Education,   Plan Details:  Patient to have GI assessment and follow up with ST after.

## 2019-12-02 ENCOUNTER — SPEECH THERAPY (OUTPATIENT)
Dept: SPEECH THERAPY | Facility: REHABILITATION | Age: 49
End: 2019-12-02
Attending: INTERNAL MEDICINE
Payer: COMMERCIAL

## 2019-12-02 DIAGNOSIS — R13.14 DYSPHAGIA, PHARYNGOESOPHAGEAL PHASE: ICD-10-CM

## 2019-12-02 PROCEDURE — 92526 ORAL FUNCTION THERAPY: CPT

## 2019-12-02 NOTE — OP THERAPY DISCHARGE SUMMARY
"  Outpatient Speech Therapy  DISCHARGE SUMMARY NOTE      [facility]  [address]  [address]  [place] [address]  Phone:  [phone]  Fax:  [phone]    Date of Visit: 12/02/2019    Patient: Al Corcoran  YOB: 1970  MRN: [id number]     Referring Provider: Solo Omer M.D.  [address] [place] [address] [address]  [place] [address]   Referring Diagnosis: Dysphagia, pharyngoesophageal phase [R13.14]     Visit #: 4    Time Calculation  Start time: 0956  Stop time: 1023 Time Calculation (min): 27 minutes       Speech Therapy Occurrence Codes    Date of Onset of Impairment:  8/14/19   Date speech therapy care plan established or reviewed:  10/14/19   Date speech therapy treatment started:  10/14/19          Chief Complaint: Dysphagia (esophageal)    Visit Diagnoses     ICD-10-CM   1. Dysphagia, pharyngoesophageal phase R13.14     Subjective:   Progress Factors:     Progression:  Getting Better  Therapy History:     Previous Treatments and Dates:  Patient has had 3 ST visits addressing pharyngeal dysphagia with slight to minimal improvements.  Additional Subjective Comments:      Patient reported doing a little better overall. Has esophageal assessment with GI 19th Dec for dilation if necessary and address if needing other interventions. Still has not called ENT, but stated he will try and get appt set up.   Reported having breakfast this morning consisting of eggs, hash browns, and cornflakes. Only noted difficulty with cornflakes \"getting hung up\" and pointed to sternal notch.  Eating 1-2 meals a day to limit coughing episodes. When he only has 1 meal a day he has no to minimal coughing episodes.  He is no longer completing HEP tasks.  Feels that esophageal deficits now and having burning in stomach.  Reported eating small snacks to alleviate the burning.  Continues to alternate liquids and solids, small intake, slow rate of intake, multiple swallows, and remain sitting for " strategies.     Objective:   Treatments/Interventions Performed:  Dysphagia treatment, Patient/Caregiver education and Compensatory strategy training  Treatment Intervention tool(s) used:  Eating assessment Tool (EAT-10)  Objective Details:  EAT 10- scored a 18 which is improved from his first assessment on evaluation of 30.  Quality of life is still being impacted in regards to going out to meals, taking more effort to swallow, pleasure decreased with PO consumption, food sticking in throat, coughing with PO intake, and swallowing causes stress.    Speech Therapy Assessment:     Pharyngeal Phase Assessment:     Pharyngeal phase comments: No overt concerns with edie pharyngeal swallow function.  MBSS reviewed again to educate and provide recommendations.    Speech Therapy Plan :   Prognosis & Recommendations  Impression Summary:  Al Corcoran is a 49 year old male who has been seen to address pharyngeal dysphagia. He is independent with swallow strategies now.  He no longer is participating with HEP as deficits appear to be more esophageal than pharyngeal.  He is following GI MD to have further assessments completed to hopefully alleviate continued complications with PO intake. No further skilled needs at this time, patient to be discharged.        Compensatory swallow strategies:  Alternate liquids/solids, Reduce bolus size, Upright position 90 degrees during meal and 45 minutes after meal, Multiple swallows and No talking while eating  Diet Recommendation:  Normal Consistency (soft)  Liquid Recommendation:  Thin Liquid  Medication Administration:  Floated/crushed to ease consumption  Goals  Short Term Goals:  Patient to complete swallowing exercise program daily to 100% accuracy to improve hyolaryngeal elevation and overall swallowing function. DISCONTINUED  Complete Modified Barium Swallow Study to r/o obstruction and determine least restrictive and safest means for nutrition/hydration. MET    Long Term Goals:   Patient to consume the least restrictive diet to maintain adequate nutrition/hydration with out s/sx of aspiration to 100%. PARTIALLY MET  Eat 10 scores to be between a 3 and 7 for improved quality of life. PARTIALLY MET  Patient progression on Long Term Goals:  Patient's EAT-10 scored significantly improved by 12 points, but were 5 points from meeting the goal.  Esophageal deficits most likely impacted ability to meet long term goals.  Plan Details:  Patient to continue to follow GI and recommendations.  ST to discharge.

## 2019-12-02 NOTE — OP THERAPY DAILY TREATMENT
"  Outpatient Speech Therapy  DAILY TREATMENT     [facility] [address].  [address]  [place] [address]  Phone:  [phone]  Fax:  [phone]    Date: 12/02/2019    Patient: Al Corcoran  YOB: 1970  MRN: [id number]     Time Calculation  Start time: 0956  Stop time: 1023 Time Calculation (min): 27 minutes       Chief Complaint: Dysphagia (esophageal)    Visit #: 4    Subjective:   Reason for Therapy:     Reason For Evaluation:  Dysphagia  Social Support:     Patient Mental Status:  Alert and Responsive  Progress Factors:     Progression:  Getting Better  Additional Subjective Comments:      Patient reported doing a little better overall. Has esophageal assessment with GI 19th Dec for dilation if necessary and address if needing other interventions. Still has not called ENT, but stated he will try and get appt set up.   Reported having breakfast this morning consisting of eggs, hash browns, and cornflakes. Only noted difficulty with cornflakes \"getting hung up\" and pointed to sternal notch.  Eating 1-2 meals a day to limit coughing episodes. When he only has 1 meal a day he has no to minimal coughing episodes.  He is no longer completing HEP tasks.  Feels that esophageal deficits now and having burning in stomach.  Reported eating small snacks to alleviate the burning.  Continues to alternate liquids and solids, small intake, slow rate of intake, multiple swallows, and remain sitting for strategies.       Objective:   Treatments/Interventions Performed:  Patient/Caregiver education, Dysphagia treatment, Home program, Compensatory strategy training and Speech/Language treatment  Treatment Intervention tool(s) used:  Eating Assessment Tool (EAT-10)  Objective Details:  EAT 10- scored a 18 which is improved from his first assessment on evaluation of 30.  Quality of life is still being impacted in regards to going out to meals, taking more effort to swallow, pleasure " decreased with PO consumption, food sticking in throat, coughing with PO intake, and swallowing causes stress.       Speech Therapy Assessment:     Oral Motor Status:     Oral motor status comments:       Pharyngeal Phase Assessment:     Pharyngeal phase comments: Reviewed video of MBSS and educated again on strategies and esophageal concerns.       Speech Therapy Plan :   Prognosis & Recommendations  Impression Summary:  Al Corcoran is a 49 year old male who was seen for follow up for pharyngeal dysphagia.     Patient continues to present with esophageal concerns with PO intake that he has been assessed with his GI MD.  He is having a more in depth assessment in Dec to determine further needs to help decrease complications.  Noted mild improvements with PO intake, but continues to have coughing episodes and difficulty eating/drinking.   He is independent with use of swallow strategies.  Compensatory swallow strategies:  Alternate liquids/solids, Reduce bolus size, Upright position 90 degrees during meal and 45 minutes after meal, Multiple swallows and No talking while eating  Diet Recommendation:  Normal Consistency  Liquid Recommendation:  Thin Liquid  Medication Administration:  Floated/crushed to ease consumption  Goals  Short Term Goals:  Patient to complete swallowing exercise program daily to 100% accuracy to improve hyolaryngeal elevation and overall swallowing function. DISCONTINUED  Complete Modified Barium Swallow Study to r/o obstruction and determine least restrictive and safest means for nutrition/hydration. MET    Plan Details:  Patient to discharge as no further needs at this time.  Will continue to follow up with GI recommendations.

## 2019-12-22 ENCOUNTER — OFFICE VISIT (OUTPATIENT)
Dept: URGENT CARE | Facility: PHYSICIAN GROUP | Age: 49
End: 2019-12-22
Payer: COMMERCIAL

## 2019-12-22 VITALS
RESPIRATION RATE: 18 BRPM | DIASTOLIC BLOOD PRESSURE: 102 MMHG | HEIGHT: 67 IN | BODY MASS INDEX: 33.59 KG/M2 | OXYGEN SATURATION: 96 % | HEART RATE: 85 BPM | TEMPERATURE: 97.7 F | WEIGHT: 214 LBS | SYSTOLIC BLOOD PRESSURE: 138 MMHG

## 2019-12-22 DIAGNOSIS — J32.9 RHINOSINUSITIS: Primary | ICD-10-CM

## 2019-12-22 PROCEDURE — 99213 OFFICE O/P EST LOW 20 MIN: CPT | Performed by: FAMILY MEDICINE

## 2019-12-22 RX ORDER — FLUTICASONE PROPIONATE 50 MCG
1 SPRAY, SUSPENSION (ML) NASAL DAILY
Qty: 16 G | Refills: 0 | Status: SHIPPED | OUTPATIENT
Start: 2019-12-22 | End: 2021-04-12

## 2019-12-22 RX ORDER — LORATADINE 10 MG/1
10 CAPSULE, LIQUID FILLED ORAL DAILY
Qty: 20 CAP | Refills: 0 | Status: SHIPPED | OUTPATIENT
Start: 2019-12-22 | End: 2021-07-19

## 2019-12-22 ASSESSMENT — ENCOUNTER SYMPTOMS
NECK PAIN: 0
COUGH: 1
SWOLLEN GLANDS: 0
HEADACHES: 0
SINUS PRESSURE: 0
CHILLS: 0
SHORTNESS OF BREATH: 0
DIAPHORESIS: 0
HOARSE VOICE: 0
SORE THROAT: 1

## 2019-12-22 NOTE — PROGRESS NOTES
Subjective:      Al Corcoran is a 49 y.o. male who presents with Congestion (feverish, cough, burning in throat/chest x3days)            This is a pleasant 49-year-old male with no significant past medical history is here today for cough and congestion for the past 3 days.patient recently had a EGD done and after that he was having discomfort, field subjective fever and postnasal drip.  Cough is dry,    Sinusitis   This is a new problem. The current episode started in the past 7 days. The problem is unchanged. There has been no fever. The pain is mild. Associated symptoms include congestion, coughing and a sore throat. Pertinent negatives include no chills, diaphoresis, ear pain, headaches, hoarse voice, neck pain, shortness of breath, sinus pressure, sneezing or swollen glands. Past treatments include nothing. The treatment provided no relief.       Review of Systems   Constitutional: Negative for chills and diaphoresis.   HENT: Positive for congestion and sore throat. Negative for ear pain, hoarse voice, sinus pressure and sneezing.    Respiratory: Positive for cough. Negative for shortness of breath.    Musculoskeletal: Negative for neck pain.   Neurological: Negative for headaches.          Objective:     There were no vitals taken for this visit.     Physical Exam  Constitutional:       General: He is not in acute distress.     Appearance: He is normal weight. He is not ill-appearing, toxic-appearing or diaphoretic.   HENT:      Head: Normocephalic and atraumatic.      Nose: Congestion present. No rhinorrhea.      Mouth/Throat:      Mouth: Mucous membranes are moist.      Pharynx: Posterior oropharyngeal erythema present. No oropharyngeal exudate.   Eyes:      Extraocular Movements: Extraocular movements intact.   Neck:      Musculoskeletal: Normal range of motion.   Cardiovascular:      Rate and Rhythm: Normal rate.   Pulmonary:      Effort: Pulmonary effort is normal. No respiratory distress.      Breath  sounds: Normal breath sounds. No stridor. No wheezing, rhonchi or rales.   Chest:      Chest wall: No tenderness.   Musculoskeletal: Normal range of motion.   Skin:     General: Skin is warm.   Neurological:      General: No focal deficit present.      Mental Status: He is alert.   Psychiatric:         Mood and Affect: Mood normal.                 Assessment/Plan:       1. Rhinosinusitis  fluticasone (FLONASE ALLERGY RELIEF) 50 MCG/ACT nasal spray    Loratadine (CLARITIN) 10 MG Cap     -Sinus symptoms more consistent with acute rhinosinusitis  -The patient had Flonase, Claritin for at least 2 weeks  -As of right now patient does not need any antibiotic, continue with salt and water gargling and tea and honey  -Please see your primary care physician within next 7 days  -This coming to urgent care if symptoms worsen or you have any other concerns

## 2020-06-25 DIAGNOSIS — R53.83 FATIGUE, UNSPECIFIED TYPE: ICD-10-CM

## 2020-06-25 DIAGNOSIS — Z12.11 SCREENING FOR MALIGNANT NEOPLASM OF COLON: ICD-10-CM

## 2020-06-25 DIAGNOSIS — Z12.5 SCREENING FOR MALIGNANT NEOPLASM OF PROSTATE: ICD-10-CM

## 2020-06-25 DIAGNOSIS — E55.9 HYPOVITAMINOSIS D: ICD-10-CM

## 2020-06-25 DIAGNOSIS — K21.9 GASTROESOPHAGEAL REFLUX DISEASE, ESOPHAGITIS PRESENCE NOT SPECIFIED: ICD-10-CM

## 2020-06-25 DIAGNOSIS — E78.5 DYSLIPIDEMIA: ICD-10-CM

## 2020-06-25 RX ORDER — FENOFIBRATE 160 MG/1
160 TABLET ORAL DAILY
Qty: 90 TAB | Refills: 5 | Status: ON HOLD | OUTPATIENT
Start: 2020-06-25 | End: 2021-04-14

## 2020-06-25 RX ORDER — OMEPRAZOLE 20 MG/1
20 CAPSULE, DELAYED RELEASE ORAL 2 TIMES DAILY
Qty: 180 CAP | Refills: 5 | Status: SHIPPED | OUTPATIENT
Start: 2020-06-25 | End: 2021-07-07 | Stop reason: SDUPTHER

## 2020-06-25 RX ORDER — SUCRALFATE 1 G/1
1 TABLET ORAL
Qty: 360 TAB | Refills: 1 | Status: SHIPPED | OUTPATIENT
Start: 2020-06-25 | End: 2021-04-12

## 2020-12-20 DIAGNOSIS — Z23 NEED FOR VACCINATION: ICD-10-CM

## 2021-04-06 ENCOUNTER — IMMUNIZATION (OUTPATIENT)
Dept: FAMILY PLANNING/WOMEN'S HEALTH CLINIC | Facility: IMMUNIZATION CENTER | Age: 51
End: 2021-04-06
Payer: COMMERCIAL

## 2021-04-06 DIAGNOSIS — Z23 ENCOUNTER FOR VACCINATION: Primary | ICD-10-CM

## 2021-04-06 PROCEDURE — 0001A PFIZER SARS-COV-2 VACCINE: CPT | Performed by: PHARMACIST

## 2021-04-06 PROCEDURE — 91300 PFIZER SARS-COV-2 VACCINE: CPT | Performed by: PHARMACIST

## 2021-04-12 ENCOUNTER — OFFICE VISIT (OUTPATIENT)
Dept: URGENT CARE | Facility: PHYSICIAN GROUP | Age: 51
End: 2021-04-12
Payer: COMMERCIAL

## 2021-04-12 VITALS
WEIGHT: 230 LBS | OXYGEN SATURATION: 96 % | HEIGHT: 68 IN | DIASTOLIC BLOOD PRESSURE: 100 MMHG | BODY MASS INDEX: 34.86 KG/M2 | RESPIRATION RATE: 16 BRPM | HEART RATE: 90 BPM | TEMPERATURE: 97.3 F | SYSTOLIC BLOOD PRESSURE: 150 MMHG

## 2021-04-12 DIAGNOSIS — R11.0 NAUSEA: ICD-10-CM

## 2021-04-12 DIAGNOSIS — I10 ESSENTIAL HYPERTENSION: ICD-10-CM

## 2021-04-12 PROCEDURE — 99214 OFFICE O/P EST MOD 30 MIN: CPT | Performed by: STUDENT IN AN ORGANIZED HEALTH CARE EDUCATION/TRAINING PROGRAM

## 2021-04-12 RX ORDER — ONDANSETRON 4 MG/1
4 TABLET, ORALLY DISINTEGRATING ORAL EVERY 6 HOURS PRN
Qty: 30 TABLET | Refills: 0 | Status: SHIPPED | OUTPATIENT
Start: 2021-04-12 | End: 2021-07-19

## 2021-04-12 NOTE — PROGRESS NOTES
Subjective:   CHIEF COMPLAINT  Chief Complaint   Patient presents with   • Dizziness     Since last night   • Nausea     Since Last night   • Tingling     Shooting pain down LT arm where Pfizer covid vaccine was administered on Tuesday       HPI  Al Corcoran is a 50 y.o. male who presents with a chief complaint of dizziness and nausea for approximately 24 hours.  No vomiting, abdominal pain, diarrhea or constipation.  He received Covid immunization 6 days ago and thinks his symptoms are secondary to his immunization.  Says his symptoms were rather significant last night while in bed and aggravated again this morning while walking into work from and looking at his computer screen.  No alleviating factors.  He has not tried taking any medications.  Reports he is experiencing myalgias in his bilateral upper and lower extremities which he attributes to the immunization.  Admits associated symptoms of chronic shortness of breath which he attributes to his weight.  He denies experiencing any chest pain, wheezing or jaw pain.  No cold-like symptoms.  No history of CAD.  No family history of CAD.    REVIEW OF SYSTEMS  General: no fever or chills  GI: no nausea or vomiting  See HPI for further details.    PAST MEDICAL HISTORY  Patient Active Problem List    Diagnosis Date Noted   • Pharyngoesophageal dysphagia 10/07/2019   • Dyspnea on exertion 06/03/2019   • Lightheaded 06/03/2019   • Essential hypertension 06/03/2019   • Mixed hyperlipidemia 06/03/2019   • Heart burn 06/03/2019   • Class 1 obesity due to excess calories without serious comorbidity with body mass index (BMI) of 33.0 to 33.9 in adult 06/03/2019       SURGICAL HISTORY   has a past surgical history that includes vasectomy.    ALLERGIES  Allergies   Allergen Reactions   • Peanuts [Peanut Oil] Anaphylaxis       CURRENT MEDICATIONS  Home Medications     Reviewed by Ganesh Dooley Ass't (Medical Assistant) on 04/12/21 at 0939  Med List Status: <None>  "  Medication Last Dose Status   Acetaminophen (TYLENOL 8 HOUR PO) PRN Active   fenofibrate (TRIGLIDE) 160 MG tablet Not Taking Active   fluticasone (FLONASE ALLERGY RELIEF) 50 MCG/ACT nasal spray Not Taking Flagged for Removal   ibuprofen (MOTRIN) 800 MG Tab PRN Active   Loratadine (CLARITIN) 10 MG Cap PRN Active   omeprazole (PRILOSEC) 20 MG delayed-release capsule Taking Active   sucralfate (CARAFATE) 1 GM Tab Not Taking Flagged for Removal                SOCIAL HISTORY  Social History     Tobacco Use   • Smoking status: Never Smoker   • Smokeless tobacco: Never Used   Substance and Sexual Activity   • Alcohol use: Yes     Comment: occ   • Drug use: No   • Sexual activity: Not on file       FAMILY HISTORY  Family History   Problem Relation Age of Onset   • Diabetes Mother    • Cancer Mother    • Heart Disease Father           Objective:   PHYSICAL EXAM  VITAL SIGNS: /100 (BP Location: Left arm, Patient Position: Sitting, BP Cuff Size: Adult long)   Pulse 90   Temp 36.3 °C (97.3 °F) (Temporal)   Resp 16   Ht 1.715 m (5' 7.5\")   Wt 104 kg (230 lb)   SpO2 96%   BMI 35.49 kg/m²     Gen: no acute distress, normal voice  Skin: dry, intact, moist mucosal membranes  Lungs: CTAB w/ symmetric expansion  CV: RRR w/o murmurs or clicks  Neuro: Speech: conversant, fluent, no aphasia  Mental status: AAOx3  Gait: normal   CN: 2-12 grossly intact  Sensory exam: globally intact  Motor exam: no global deficits   Psych: normal affect, normal judgement, alert, awake    Assessment/Plan:     1. Nausea     2. Essential hypertension     1)?  Etiology; possibly related to Covid mutation vs viral infection.  Exam is reassuring.  No red flags.  Discussed symptomatic treatment.  -Rx sent for Zofran  -Encouraged hydration  -Tylenol/ibuprofen as needed  -If symptoms worsen or do not improve within the next couple of days instructed the patient to the emergency room for further evaluation and treatment.  Patient verbalized " understanding.  All questions were answered.    2) BP slightly elevated 150/100.  On review of the patient's chart it appears his blood pressure has been controlled on previous visits with lifestyle modifications.  Elevated reading today could be situational.  Patient is not currently on any hypertensive medications.  -Instructed to follow-up with PCP to reevaluate blood pressure and definitive management    Differential diagnosis, natural history, supportive care, and indications for immediate follow-up discussed. All questions answered. Patient agrees with the plan of care.    Follow-up as needed if symptoms worsen or fail to improve to PCP, Urgent care or Emergency Room.    Please note that this dictation was created using voice recognition software. I have made a reasonable attempt to correct obvious errors, but I expect that there are errors of grammar and possibly content that I did not discover before finalizing the note.

## 2021-04-13 ENCOUNTER — HOSPITAL ENCOUNTER (OUTPATIENT)
Facility: MEDICAL CENTER | Age: 51
DRG: 149 | End: 2021-04-14
Attending: EMERGENCY MEDICINE | Admitting: INTERNAL MEDICINE
Payer: COMMERCIAL

## 2021-04-13 ENCOUNTER — APPOINTMENT (OUTPATIENT)
Dept: CARDIOLOGY | Facility: MEDICAL CENTER | Age: 51
DRG: 149 | End: 2021-04-13
Attending: INTERNAL MEDICINE
Payer: COMMERCIAL

## 2021-04-13 ENCOUNTER — APPOINTMENT (OUTPATIENT)
Dept: RADIOLOGY | Facility: MEDICAL CENTER | Age: 51
DRG: 149 | End: 2021-04-13
Attending: EMERGENCY MEDICINE
Payer: COMMERCIAL

## 2021-04-13 DIAGNOSIS — R42 DIZZINESS: ICD-10-CM

## 2021-04-13 PROBLEM — R74.01 ALT (SGPT) LEVEL RAISED: Status: ACTIVE | Noted: 2021-04-13

## 2021-04-13 PROBLEM — K21.9 GERD (GASTROESOPHAGEAL REFLUX DISEASE): Status: ACTIVE | Noted: 2021-04-13

## 2021-04-13 PROBLEM — E66.9 OBESITY (BMI 30-39.9): Status: ACTIVE | Noted: 2021-04-13

## 2021-04-13 PROBLEM — R73.9 HYPERGLYCEMIA: Status: ACTIVE | Noted: 2021-04-13

## 2021-04-13 LAB
ALBUMIN SERPL BCP-MCNC: 4.5 G/DL (ref 3.2–4.9)
ALBUMIN/GLOB SERPL: 1.4 G/DL
ALP SERPL-CCNC: 78 U/L (ref 30–99)
ALT SERPL-CCNC: 81 U/L (ref 2–50)
ANION GAP SERPL CALC-SCNC: 10 MMOL/L (ref 7–16)
AST SERPL-CCNC: 45 U/L (ref 12–45)
BASOPHILS # BLD AUTO: 0.8 % (ref 0–1.8)
BASOPHILS # BLD: 0.05 K/UL (ref 0–0.12)
BILIRUB SERPL-MCNC: 0.5 MG/DL (ref 0.1–1.5)
BUN SERPL-MCNC: 15 MG/DL (ref 8–22)
CALCIUM SERPL-MCNC: 9.4 MG/DL (ref 8.5–10.5)
CHLORIDE SERPL-SCNC: 103 MMOL/L (ref 96–112)
CO2 SERPL-SCNC: 25 MMOL/L (ref 20–33)
CREAT SERPL-MCNC: 1.12 MG/DL (ref 0.5–1.4)
EKG IMPRESSION: NORMAL
EOSINOPHIL # BLD AUTO: 0.13 K/UL (ref 0–0.51)
EOSINOPHIL NFR BLD: 2.2 % (ref 0–6.9)
ERYTHROCYTE [DISTWIDTH] IN BLOOD BY AUTOMATED COUNT: 43.9 FL (ref 35.9–50)
EST. AVERAGE GLUCOSE BLD GHB EST-MCNC: 148 MG/DL
GLOBULIN SER CALC-MCNC: 3.2 G/DL (ref 1.9–3.5)
GLUCOSE SERPL-MCNC: 142 MG/DL (ref 65–99)
HBA1C MFR BLD: 6.8 % (ref 4–5.6)
HCT VFR BLD AUTO: 48.1 % (ref 42–52)
HGB BLD-MCNC: 16.1 G/DL (ref 14–18)
IMM GRANULOCYTES # BLD AUTO: 0.02 K/UL (ref 0–0.11)
IMM GRANULOCYTES NFR BLD AUTO: 0.3 % (ref 0–0.9)
LV EJECT FRACT  99904: 60
LV EJECT FRACT MOD 2C 99903: 58.52
LV EJECT FRACT MOD 4C 99902: 73.19
LV EJECT FRACT MOD BP 99901: 68.47
LYMPHOCYTES # BLD AUTO: 1.67 K/UL (ref 1–4.8)
LYMPHOCYTES NFR BLD: 28 % (ref 22–41)
MCH RBC QN AUTO: 29.8 PG (ref 27–33)
MCHC RBC AUTO-ENTMCNC: 33.5 G/DL (ref 33.7–35.3)
MCV RBC AUTO: 89.1 FL (ref 81.4–97.8)
MONOCYTES # BLD AUTO: 0.57 K/UL (ref 0–0.85)
MONOCYTES NFR BLD AUTO: 9.6 % (ref 0–13.4)
NEUTROPHILS # BLD AUTO: 3.52 K/UL (ref 1.82–7.42)
NEUTROPHILS NFR BLD: 59.1 % (ref 44–72)
NRBC # BLD AUTO: 0 K/UL
NRBC BLD-RTO: 0 /100 WBC
PLATELET # BLD AUTO: 231 K/UL (ref 164–446)
PMV BLD AUTO: 9.3 FL (ref 9–12.9)
POTASSIUM SERPL-SCNC: 4.1 MMOL/L (ref 3.6–5.5)
PROT SERPL-MCNC: 7.7 G/DL (ref 6–8.2)
RBC # BLD AUTO: 5.4 M/UL (ref 4.7–6.1)
SARS-COV-2 RNA RESP QL NAA+PROBE: NOTDETECTED
SODIUM SERPL-SCNC: 138 MMOL/L (ref 135–145)
SPECIMEN SOURCE: NORMAL
TROPONIN T SERPL-MCNC: <6 NG/L (ref 6–19)
WBC # BLD AUTO: 6 K/UL (ref 4.8–10.8)

## 2021-04-13 PROCEDURE — 99285 EMERGENCY DEPT VISIT HI MDM: CPT

## 2021-04-13 PROCEDURE — 700102 HCHG RX REV CODE 250 W/ 637 OVERRIDE(OP): Performed by: EMERGENCY MEDICINE

## 2021-04-13 PROCEDURE — 93306 TTE W/DOPPLER COMPLETE: CPT | Mod: 26 | Performed by: INTERNAL MEDICINE

## 2021-04-13 PROCEDURE — A9270 NON-COVERED ITEM OR SERVICE: HCPCS | Performed by: INTERNAL MEDICINE

## 2021-04-13 PROCEDURE — 80053 COMPREHEN METABOLIC PANEL: CPT

## 2021-04-13 PROCEDURE — 99219 PR INITIAL OBSERVATION CARE,LEVL II: CPT | Performed by: INTERNAL MEDICINE

## 2021-04-13 PROCEDURE — U0003 INFECTIOUS AGENT DETECTION BY NUCLEIC ACID (DNA OR RNA); SEVERE ACUTE RESPIRATORY SYNDROME CORONAVIRUS 2 (SARS-COV-2) (CORONAVIRUS DISEASE [COVID-19]), AMPLIFIED PROBE TECHNIQUE, MAKING USE OF HIGH THROUGHPUT TECHNOLOGIES AS DESCRIBED BY CMS-2020-01-R: HCPCS

## 2021-04-13 PROCEDURE — 94760 N-INVAS EAR/PLS OXIMETRY 1: CPT

## 2021-04-13 PROCEDURE — 700102 HCHG RX REV CODE 250 W/ 637 OVERRIDE(OP): Performed by: INTERNAL MEDICINE

## 2021-04-13 PROCEDURE — 93005 ELECTROCARDIOGRAM TRACING: CPT | Performed by: EMERGENCY MEDICINE

## 2021-04-13 PROCEDURE — 93306 TTE W/DOPPLER COMPLETE: CPT

## 2021-04-13 PROCEDURE — A9270 NON-COVERED ITEM OR SERVICE: HCPCS | Performed by: EMERGENCY MEDICINE

## 2021-04-13 PROCEDURE — U0005 INFEC AGEN DETEC AMPLI PROBE: HCPCS

## 2021-04-13 PROCEDURE — 770006 HCHG ROOM/CARE - MED/SURG/GYN SEMI*

## 2021-04-13 PROCEDURE — 85025 COMPLETE CBC W/AUTO DIFF WBC: CPT

## 2021-04-13 PROCEDURE — 83036 HEMOGLOBIN GLYCOSYLATED A1C: CPT

## 2021-04-13 PROCEDURE — 71045 X-RAY EXAM CHEST 1 VIEW: CPT

## 2021-04-13 PROCEDURE — G0378 HOSPITAL OBSERVATION PER HR: HCPCS

## 2021-04-13 PROCEDURE — 93005 ELECTROCARDIOGRAM TRACING: CPT

## 2021-04-13 PROCEDURE — 84484 ASSAY OF TROPONIN QUANT: CPT

## 2021-04-13 PROCEDURE — 70450 CT HEAD/BRAIN W/O DYE: CPT

## 2021-04-13 RX ORDER — BISACODYL 10 MG
10 SUPPOSITORY, RECTAL RECTAL
Status: DISCONTINUED | OUTPATIENT
Start: 2021-04-13 | End: 2021-04-14 | Stop reason: HOSPADM

## 2021-04-13 RX ORDER — LABETALOL HYDROCHLORIDE 5 MG/ML
10 INJECTION, SOLUTION INTRAVENOUS EVERY 4 HOURS PRN
Status: DISCONTINUED | OUTPATIENT
Start: 2021-04-13 | End: 2021-04-14 | Stop reason: HOSPADM

## 2021-04-13 RX ORDER — AMOXICILLIN 250 MG
2 CAPSULE ORAL 2 TIMES DAILY
Status: DISCONTINUED | OUTPATIENT
Start: 2021-04-13 | End: 2021-04-14 | Stop reason: HOSPADM

## 2021-04-13 RX ORDER — AMLODIPINE BESYLATE 5 MG/1
5 TABLET ORAL
Status: DISCONTINUED | OUTPATIENT
Start: 2021-04-13 | End: 2021-04-14 | Stop reason: HOSPADM

## 2021-04-13 RX ORDER — HEPARIN SODIUM 5000 [USP'U]/ML
5000 INJECTION, SOLUTION INTRAVENOUS; SUBCUTANEOUS EVERY 8 HOURS
Status: DISCONTINUED | OUTPATIENT
Start: 2021-04-13 | End: 2021-04-14 | Stop reason: HOSPADM

## 2021-04-13 RX ORDER — OMEPRAZOLE 20 MG/1
20 CAPSULE, DELAYED RELEASE ORAL DAILY
Status: DISCONTINUED | OUTPATIENT
Start: 2021-04-14 | End: 2021-04-14 | Stop reason: HOSPADM

## 2021-04-13 RX ORDER — ACETAMINOPHEN 325 MG/1
650 TABLET ORAL EVERY 6 HOURS PRN
Status: DISCONTINUED | OUTPATIENT
Start: 2021-04-13 | End: 2021-04-14 | Stop reason: HOSPADM

## 2021-04-13 RX ORDER — POLYETHYLENE GLYCOL 3350 17 G/17G
1 POWDER, FOR SOLUTION ORAL
Status: DISCONTINUED | OUTPATIENT
Start: 2021-04-13 | End: 2021-04-14 | Stop reason: HOSPADM

## 2021-04-13 RX ORDER — MECLIZINE HYDROCHLORIDE 25 MG/1
25 TABLET ORAL ONCE
Status: COMPLETED | OUTPATIENT
Start: 2021-04-13 | End: 2021-04-13

## 2021-04-13 RX ORDER — ACETAMINOPHEN 500 MG
1000 TABLET ORAL EVERY 6 HOURS PRN
COMMUNITY
End: 2022-12-13

## 2021-04-13 RX ORDER — MECLIZINE HCL 12.5 MG/1
12.5 TABLET ORAL 3 TIMES DAILY PRN
Status: DISCONTINUED | OUTPATIENT
Start: 2021-04-13 | End: 2021-04-14 | Stop reason: HOSPADM

## 2021-04-13 RX ADMIN — ACETAMINOPHEN 650 MG: 325 TABLET, FILM COATED ORAL at 18:24

## 2021-04-13 RX ADMIN — MECLIZINE HYDROCHLORIDE 25 MG: 25 TABLET ORAL at 08:33

## 2021-04-13 RX ADMIN — AMLODIPINE BESYLATE 5 MG: 5 TABLET ORAL at 18:24

## 2021-04-13 ASSESSMENT — LIFESTYLE VARIABLES
DO YOU DRINK ALCOHOL: NO
EVER HAD A DRINK FIRST THING IN THE MORNING TO STEADY YOUR NERVES TO GET RID OF A HANGOVER: NO
CONSUMPTION TOTAL: NEGATIVE
HOW MANY TIMES IN THE PAST YEAR HAVE YOU HAD 5 OR MORE DRINKS IN A DAY: 0
TOTAL SCORE: 0
TOTAL SCORE: 0
EVER FELT BAD OR GUILTY ABOUT YOUR DRINKING: NO
TOTAL SCORE: 0
HAVE YOU EVER FELT YOU SHOULD CUT DOWN ON YOUR DRINKING: NO
AVERAGE NUMBER OF DAYS PER WEEK YOU HAVE A DRINK CONTAINING ALCOHOL: 0
SUBSTANCE_ABUSE: 0
HAVE PEOPLE ANNOYED YOU BY CRITICIZING YOUR DRINKING: NO
ON A TYPICAL DAY WHEN YOU DRINK ALCOHOL HOW MANY DRINKS DO YOU HAVE: 0
ALCOHOL_USE: NO

## 2021-04-13 ASSESSMENT — ENCOUNTER SYMPTOMS
SPUTUM PRODUCTION: 0
HALLUCINATIONS: 0
BACK PAIN: 0
EYE PAIN: 0
PHOTOPHOBIA: 0
TINGLING: 0
NERVOUS/ANXIOUS: 0
WEIGHT LOSS: 0
SPEECH CHANGE: 0
BLURRED VISION: 1
DOUBLE VISION: 0
NECK PAIN: 0
NAUSEA: 0
HEMOPTYSIS: 0
CLAUDICATION: 0
HEARTBURN: 0
ABDOMINAL PAIN: 0
FOCAL WEAKNESS: 0
SENSORY CHANGE: 0
ORTHOPNEA: 0
CHILLS: 0
COUGH: 0
TREMORS: 0
DIZZINESS: 1
VOMITING: 0
PALPITATIONS: 0
MYALGIAS: 0
LOSS OF CONSCIOUSNESS: 0
EYE DISCHARGE: 0
DIARRHEA: 0
FEVER: 0
DEPRESSION: 0

## 2021-04-13 ASSESSMENT — COGNITIVE AND FUNCTIONAL STATUS - GENERAL
MOBILITY SCORE: 19
DAILY ACTIVITIY SCORE: 24
STANDING UP FROM CHAIR USING ARMS: A LITTLE
MOVING TO AND FROM BED TO CHAIR: A LITTLE
WALKING IN HOSPITAL ROOM: A LITTLE
MOVING FROM LYING ON BACK TO SITTING ON SIDE OF FLAT BED: A LITTLE
SUGGESTED CMS G CODE MODIFIER MOBILITY: CK
CLIMB 3 TO 5 STEPS WITH RAILING: A LITTLE
SUGGESTED CMS G CODE MODIFIER DAILY ACTIVITY: CH

## 2021-04-13 ASSESSMENT — PATIENT HEALTH QUESTIONNAIRE - PHQ9
1. LITTLE INTEREST OR PLEASURE IN DOING THINGS: NOT AT ALL
2. FEELING DOWN, DEPRESSED, IRRITABLE, OR HOPELESS: NOT AT ALL
SUM OF ALL RESPONSES TO PHQ9 QUESTIONS 1 AND 2: 0

## 2021-04-13 ASSESSMENT — FIBROSIS 4 INDEX: FIB4 SCORE: 1.13

## 2021-04-13 ASSESSMENT — PAIN DESCRIPTION - PAIN TYPE
TYPE: ACUTE PAIN
TYPE: ACUTE PAIN

## 2021-04-13 NOTE — ED TRIAGE NOTES
Chief Complaint   Patient presents with   • Dizziness     Dizzy and nauseated since sat.  First civd vaccinbe last tues.  Went to urgent care yesterday and /100  Told to monitor it. Today 168/112 and still feel dizzy   • Hypertension

## 2021-04-13 NOTE — ASSESSMENT & PLAN NOTE
ALT on admission is 81  This is most likely related to patient's BMI and possible fatty liver disease  Patient denies abdominal pain  He will require close follow up as outpatient

## 2021-04-13 NOTE — ED NOTES
Patient to [facility] with steady gait. Wife at bedside. Patient currently c/o dizziness and nausea.  Chart up for ERP

## 2021-04-13 NOTE — ASSESSMENT & PLAN NOTE
Initial BP reading on admission was a SBP in the 170's, repeat BP was in .  Patient mentions he has been having elevated BP lately  As per patient he has been told he had HTN in the past being treated with weight loss and diet  We will start amlodipine 5 mg  PRN labetalol  Monitor and make changes accordingly

## 2021-04-13 NOTE — CARE PLAN
Problem: Communication  Goal: The ability to communicate needs accurately and effectively will improve  Outcome: PROGRESSING AS EXPECTED  Note: Encouraged pt to voice feelings regarding course of stay and medications.     Problem: Safety  Goal: Will remain free from injury  Outcome: PROGRESSING AS EXPECTED  Note: Discussed need for strip alarm at this time due to pts intermittent dizziness. Educated on when to call and need for SB assist while ambulating at this time

## 2021-04-13 NOTE — H&P
Hospital Medicine History & Physical Note    Date of Service  4/13/2021    Primary Care Physician  Solo Omer M.D.    Consultants  None    Code Status  Full Code    Chief Complaint  Chief Complaint   Patient presents with   • Dizziness     Dizzy and nauseated since sat.  First civd vaccinbe last tues.  Went to urgent care yesterday and /100  Told to monitor it. Today 168/112 and still feel dizzy   • Hypertension       History of Presenting Illness  50 y.o. male who presented 4/13/2021 with a past medical history of hypertension, GERD, obesity that comes to the ED complaining of dizziness.  The patient states that he was in his usual state of health up until last Saturday (4/10/21) while he was doing yard work he started complaining of dizziness.  The patient states that ever since he has had waves of dizziness that accentuate whenever he stands up.  The patient mentions that he has been told in the past that he has had vertigo but never treated.  However he mentions that this episode is way worse than in the past.  The patient states that when he is having his dizzy spells he has blurry vision that goes away when he sits down for a while.  The patient denies chest pain, abdominal pain, constipation, diarrhea or any other focal neurological deficit.    Of note, the patient was found to be hypertensive with a systolic blood pressure in the 170s on admission, with a repeat systolic blood pressure in the 160s.  The patient states that he has noticed that his blood pressure has been more elevated than usual.  He reports systolic blood pressure readings at his house in the 150s and 160s.  He has not taken high blood pressure medication in the past, and was previously treated with weight loss, diet and lifestyle modifications.    Review of Systems  Review of Systems   Constitutional: Negative for chills, fever, malaise/fatigue and weight loss.   HENT: Negative for congestion, ear discharge, ear pain, hearing  loss, nosebleeds and tinnitus.    Eyes: Positive for blurred vision. Negative for double vision, photophobia, pain and discharge.   Respiratory: Negative for cough, hemoptysis and sputum production.    Cardiovascular: Negative for chest pain, palpitations, orthopnea, claudication and leg swelling.   Gastrointestinal: Negative for abdominal pain, diarrhea, heartburn, nausea and vomiting.   Genitourinary: Negative for dysuria, frequency, hematuria and urgency.   Musculoskeletal: Negative for back pain, myalgias and neck pain.   Skin: Negative for itching and rash.   Neurological: Positive for dizziness. Negative for tingling, tremors, sensory change, speech change, focal weakness and loss of consciousness.   Psychiatric/Behavioral: Negative for depression, hallucinations and substance abuse. The patient is not nervous/anxious.        Past Medical History   has a past medical history of Class 1 obesity due to excess calories without serious comorbidity with body mass index (BMI) of 33.0 to 33.9 in adult (6/3/2019), Dyspnea on exertion (6/3/2019), Essential hypertension (6/3/2019), Heart burn (6/3/2019), High cholesterol, High triglycerides, Hypertension, Lightheaded (6/3/2019), and Mixed hyperlipidemia (6/3/2019).    Surgical History   has a past surgical history that includes vasectomy.     Family History  family history includes Cancer in his mother; Diabetes in his mother; Heart Disease in his father.     Social History   reports that he has never smoked. He has never used smokeless tobacco. He reports current alcohol use. He reports that he does not use drugs.    Allergies  Allergies   Allergen Reactions   • Peanuts [Peanut Oil] Anaphylaxis       Medications  Prior to Admission Medications   Prescriptions Last Dose Informant Patient Reported? Taking?   Loratadine (CLARITIN) 10 MG Cap Not Taking at Unknown time  No No   Sig: Take 10 mg by mouth every day.   Patient not taking: Reported on 4/13/2021   acetaminophen  (TYLENOL) 500 MG Tab 4/12/2021 at PM  Yes Yes   Sig: Take 1,000 mg by mouth every 6 hours as needed. Indications: Pain   fenofibrate (TRIGLIDE) 160 MG tablet Not Taking at Unknown time  No No   Sig: Take 1 Tab by mouth every day.   Patient not taking: Reported on 4/13/2021   omeprazole (PRILOSEC) 20 MG delayed-release capsule 4/13/2021 at AM  No No   Sig: Take 1 Cap by mouth 2 times a day.   Patient taking differently: Take 20 mg by mouth every morning.   ondansetron (ZOFRAN ODT) 4 MG TABLET DISPERSIBLE Not Taking at Unknown time  No No   Sig: Take 1 tablet by mouth every 6 hours as needed for Nausea.   Patient not taking: Reported on 4/13/2021      Facility-Administered Medications: None       Physical Exam  Temp:  [36.3 °C (97.4 °F)] 36.3 °C (97.4 °F)  Pulse:  [83-96] 85  Resp:  [18-24] 24  BP: (157-178)/() 160/98  SpO2:  [97 %-99 %] 97 %    Physical Exam  Constitutional:       General: He is not in acute distress.     Appearance: He is obese. He is not ill-appearing or toxic-appearing.   HENT:      Head: Normocephalic and atraumatic.      Comments: Lansdowne-Young pike maneuver caused dizziness, but I could not identfy nystagmus in my evaluation.     Mouth/Throat:      Pharynx: No oropharyngeal exudate.   Eyes:      General:         Right eye: No discharge.         Left eye: No discharge.      Extraocular Movements: Extraocular movements intact.      Pupils: Pupils are equal, round, and reactive to light.   Cardiovascular:      Rate and Rhythm: Normal rate and regular rhythm.      Heart sounds: No murmur. No gallop.    Pulmonary:      Effort: Pulmonary effort is normal. No respiratory distress.      Breath sounds: No wheezing or rales.   Abdominal:      General: There is no distension.      Palpations: Abdomen is soft.      Tenderness: There is no abdominal tenderness. There is no guarding.   Musculoskeletal:         General: Normal range of motion.      Cervical back: Normal range of motion.   Skin:     General:  Skin is warm and dry.   Neurological:      General: No focal deficit present.      Mental Status: He is alert and oriented to person, place, and time.      Cranial Nerves: No cranial nerve deficit.      Sensory: No sensory deficit.      Motor: No weakness.   Psychiatric:         Mood and Affect: Mood normal.         Behavior: Behavior normal.         Thought Content: Thought content normal.         Judgment: Judgment normal.         Laboratory:  Recent Labs     04/13/21 0814   WBC 6.0   RBC 5.40   HEMOGLOBIN 16.1   HEMATOCRIT 48.1   MCV 89.1   MCH 29.8   MCHC 33.5*   RDW 43.9   PLATELETCT 231   MPV 9.3     Recent Labs     04/13/21 0814   SODIUM 138   POTASSIUM 4.1   CHLORIDE 103   CO2 25   GLUCOSE 142*   BUN 15   CREATININE 1.12   CALCIUM 9.4     Recent Labs     04/13/21 0814   ALTSGPT 81*   ASTSGOT 45   ALKPHOSPHAT 78   TBILIRUBIN 0.5   GLUCOSE 142*         No results for input(s): NTPROBNP in the last 72 hours.      Recent Labs     04/13/21 0814   TROPONINT <6       Imaging:  CT-HEAD W/O   Final Result      Negative noncontrast CT scan of the head / brain.      DX-CHEST-PORTABLE (1 VIEW)   Final Result      No acute cardiac or pulmonary abnormalities are identified.      MR-BRAIN-W/O    (Results Pending)   EC-ECHOCARDIOGRAM COMPLETE W/ CONT    (Results Pending)         Assessment/Plan:  I anticipate this patient is appropriate for observation status at this time.    * Dizziness  Assessment & Plan  The patient states that he started having dizziness on Saturday.  He mentions that he has had history of vertigo in the past with chronic dizzy spells, however he mentions this was more severe than previously.  During my physical examination I performed a Isamar-Hallpike which exacerbated the patient's dizziness, however I was unable to identify nystagmus.  As per history and clinically the patient most likely diagnosis is benign paroxysmal positional vertigo.  We have started the patient on meclizine as needed.  I  discussed with the patient the Brand-Daroff exercises, and advised for a low sodium diet.    CT scan of the head in the ER did not show any acute abnormality.  Due to the acuity and severity of the symptoms we will order MRI of the brain without contrast as well as an echocardiogram.  It is unlikely that this is a CVA. His symptoms have been more than 72 hours, there is no need for permissive hypertension at this time.  We have ordered lipid panel, pending result.    Essential hypertension- (present on admission)  Assessment & Plan  Initial BP reading on admission was a SBP in the 170's, repeat BP was in .  Patient mentions he has been having elevated BP lately  As per patient he has been told he had HTN in the past being treated with weight loss and diet  We will start amlodipine 5 mg  PRN labetalol  Monitor and make changes accordingly    Hyperglycemia  Assessment & Plan  Patient with a glucose of 142 this morning  He denies past medical history of DM  We have ordered A1c, pending result  Monitor, repeat BMP tomorrow    ALT (SGPT) level raised  Assessment & Plan  ALT on admission is 81  This is most likely related to patient's BMI and possible fatty liver disease  Patient denies abdominal pain  He will require close follow up as outpatient    Obesity (BMI 30-39.9)  Assessment & Plan  Patient counseled on lifestyle modifications  He will require close follow up as outpatient    GERD (gastroesophageal reflux disease)  Assessment & Plan  Continue home dose of omeprazole        DVT prophylaxis: Heparin

## 2021-04-13 NOTE — ASSESSMENT & PLAN NOTE
Patient with a glucose of 142 this morning  He denies past medical history of DM  We have ordered A1c, pending result  Monitor, repeat BMP tomorrow

## 2021-04-13 NOTE — ASSESSMENT & PLAN NOTE
The patient states that he started having dizziness on Saturday.  He mentions that he has had history of vertigo in the past with chronic dizzy spells, however he mentions this was more severe than previously.  During my physical examination I performed a Isamar-Hallpike which exacerbated the patient's dizziness, however I was unable to identify nystagmus.  As per history and clinically the patient most likely diagnosis is benign paroxysmal positional vertigo.  We have started the patient on meclizine as needed.  I discussed with the patient the Brand-Daroff exercises, and advised for a low sodium diet.    CT scan of the head in the ER did not show any acute abnormality.  Due to the acuity and severity of the symptoms we will order MRI of the brain without contrast as well as an echocardiogram.  It is unlikely that this is a CVA. His symptoms have been more than 72 hours, there is no need for permissive hypertension at this time.  We have ordered lipid panel, pending result.

## 2021-04-13 NOTE — ED NOTES
Transport at bedside to transport patient to [facility]

## 2021-04-13 NOTE — PROGRESS NOTES
Pt arrived to unit at 1440. Wife at bedside and added to visitor list. Discussed POC and provided water and call light. Assessment completed, pt states headache 3/10 pain. VSS on RA, A&O4. No other needs at this time. Strip alarm on pt due to dizziness and education regarding call light provided

## 2021-04-13 NOTE — ED PROVIDER NOTES
ED Provider Note    Scribed for Trinh Glynn M.D. by Quincy Partida. 4/13/2021, 8:12 AM.    Primary care provider: Solo Omer M.D.  Means of arrival: Walk in  History obtained from: Patient  History limited by: None    CHIEF COMPLAINT  Chief Complaint   Patient presents with    Dizziness     Dizzy and nauseated since sat.  First civd vaccinbe last tues.  Went to urgent care yesterday and /100  Told to monitor it. Today 168/112 and still feel dizzy    Hypertension       HPI  Al Corcoran is a 50 y.o. male who presents to the Emergency Department for intermittent dizziness onset 4 days ago. The patient states that his dizziness is both room spinning and lightheadedness. He notes that it comes in waves of different strengths. He denies that certain positions make it better or worse.Patient did go to urgent care yesterday, and they told him if it continued to happen to come into the ED. At this time he endorses blurred visions and nausea, but denies any chest pain, unilateral weakness, unilateral numbness, drooling, trouble speaking, trouble swallowing, leg swelling, or vomiting. Patient does not that he is chronically short of breath, but is unsure why. He does note that he received his Covid-19 vaccine 7 days ago. Family history of COPD. No known cardiac history. Patient does not smoke. He denies any new medications. No exacerbating or alleviating factors were stated.     REVIEW OF SYSTEMS  Pertinent positives include for dizziness, blurry vision, and nausea. Pertinent negatives include no chest pain, unilateral weakness, unilateral numbness, leg swelling, or vomiting. All other systems reviewed and negative.     PAST MEDICAL HISTORY   has a past medical history of Class 1 obesity due to excess calories without serious comorbidity with body mass index (BMI) of 33.0 to 33.9 in adult (6/3/2019), Dyspnea on exertion (6/3/2019), Essential hypertension (6/3/2019), Heart burn (6/3/2019), High cholesterol,  "High triglycerides, Hypertension, Lightheaded (6/3/2019), and Mixed hyperlipidemia (6/3/2019).    SURGICAL HISTORY   has a past surgical history that includes vasectomy.    SOCIAL HISTORY  Social History     Tobacco Use    Smoking status: Never Smoker    Smokeless tobacco: Never Used   Substance Use Topics    Alcohol use: Yes     Comment: occ    Drug use: No      Social History     Substance and Sexual Activity   Drug Use No       FAMILY HISTORY  Family History   Problem Relation Age of Onset    Diabetes Mother     Cancer Mother     Heart Disease Father        CURRENT MEDICATIONS  Home Medications       Reviewed by Lilli Shah R.N. (Registered Nurse) on 04/13/21 at 0736  Med List Status: Complete     Medication Last Dose Status   Acetaminophen (TYLENOL 8 HOUR PO)  Active   fenofibrate (TRIGLIDE) 160 MG tablet  Active   ibuprofen (MOTRIN) 800 MG Tab  Active   Loratadine (CLARITIN) 10 MG Cap  Active   omeprazole (PRILOSEC) 20 MG delayed-release capsule  Active   ondansetron (ZOFRAN ODT) 4 MG TABLET DISPERSIBLE  Active                    ALLERGIES  Allergies   Allergen Reactions    Peanuts [Peanut Oil] Anaphylaxis       PHYSICAL EXAM  VITAL SIGNS: BP (!) 178/111   Pulse 96   Temp 36.3 °C (97.4 °F) (Temporal)   Resp 18   Ht 1.715 m (5' 7.5\")   Wt 104 kg (229 lb 11.5 oz)   SpO2 99%   BMI 35.45 kg/m²     Constitutional: Well developed, No acute distress, Non-toxic appearance.   HENT: Normocephalic, Atraumatic, Bilateral external ears normal,  Nose normal.   Eyes: PERRL, EOMI, Conjunctiva normal.  No nystagmus  Neck: Normal range of motion, No tenderness, Supple.    Cardiovascular: Normal heart rate, Normal rhythm.    Thorax & Lungs: Normal breath sounds, No respiratory distress.    Abdomen: Benign abdominal exam, no tenderness, no distention, no guarding, no rebound.   Skin: Warm, Dry, No erythema, No rash.   Back: No tenderness, No CVA tenderness.   Extremities: Intact distal pulses, No edema, No " tenderness  Neurologic: Alert & oriented x 3. Cranial nerves II through XII intact. No facial asymmetry. Good strength bilaterally. No pronator drift. Normal rapid hand movements, normal finger to nose. No ataxia, normal gait. +5 strength in upper and lower extremities and intact sensation to light touch throughout.   Psychiatric: Appropriate                                              DIAGNOSTIC STUDIES / PROCEDURES\    LABS  Results for orders placed or performed during the hospital encounter of 04/13/21   CBC WITH DIFFERENTIAL   Result Value Ref Range    WBC 6.0 4.8 - 10.8 K/uL    RBC 5.40 4.70 - 6.10 M/uL    Hemoglobin 16.1 14.0 - 18.0 g/dL    Hematocrit 48.1 42.0 - 52.0 %    MCV 89.1 81.4 - 97.8 fL    MCH 29.8 27.0 - 33.0 pg    MCHC 33.5 (L) 33.7 - 35.3 g/dL    RDW 43.9 35.9 - 50.0 fL    Platelet Count 231 164 - 446 K/uL    MPV 9.3 9.0 - 12.9 fL    Neutrophils-Polys 59.10 44.00 - 72.00 %    Lymphocytes 28.00 22.00 - 41.00 %    Monocytes 9.60 0.00 - 13.40 %    Eosinophils 2.20 0.00 - 6.90 %    Basophils 0.80 0.00 - 1.80 %    Immature Granulocytes 0.30 0.00 - 0.90 %    Nucleated RBC 0.00 /100 WBC    Neutrophils (Absolute) 3.52 1.82 - 7.42 K/uL    Lymphs (Absolute) 1.67 1.00 - 4.80 K/uL    Monos (Absolute) 0.57 0.00 - 0.85 K/uL    Eos (Absolute) 0.13 0.00 - 0.51 K/uL    Baso (Absolute) 0.05 0.00 - 0.12 K/uL    Immature Granulocytes (abs) 0.02 0.00 - 0.11 K/uL    NRBC (Absolute) 0.00 K/uL   COMP METABOLIC PANEL   Result Value Ref Range    Sodium 138 135 - 145 mmol/L    Potassium 4.1 3.6 - 5.5 mmol/L    Chloride 103 96 - 112 mmol/L    Co2 25 20 - 33 mmol/L    Anion Gap 10.0 7.0 - 16.0    Glucose 142 (H) 65 - 99 mg/dL    Bun 15 8 - 22 mg/dL    Creatinine 1.12 0.50 - 1.40 mg/dL    Calcium 9.4 8.5 - 10.5 mg/dL    AST(SGOT) 45 12 - 45 U/L    ALT(SGPT) 81 (H) 2 - 50 U/L    Alkaline Phosphatase 78 30 - 99 U/L    Total Bilirubin 0.5 0.1 - 1.5 mg/dL    Albumin 4.5 3.2 - 4.9 g/dL    Total Protein 7.7 6.0 - 8.2 g/dL     Globulin 3.2 1.9 - 3.5 g/dL    A-G Ratio 1.4 g/dL   TROPONIN   Result Value Ref Range    Troponin T <6 6 - 19 ng/L   ESTIMATED GFR   Result Value Ref Range    GFR If African American >60 >60 mL/min/1.73 m 2    GFR If Non African American >60 >60 mL/min/1.73 m 2   SARS-CoV-2 PCR (24 hour In-House): Collect NP swab in VTM    Specimen: Nasopharyngeal; Respirate   Result Value Ref Range    SARS-CoV-2 Source NP Swab    EKG (NOW)   Result Value Ref Range    Report       [facility] Emergency Dept.    Test Date:  2021  Pt Name:    NIKKI HERNANDEZ              Department: ER  MRN:        [id number]                      Room:  Gender:     Male                         Technician: [id number]  :        1970                   Requested By:ER TRIAGE PROTOCOL  Order #:    [id number]                    Reading MD: Trinh Lamb    Measurements  Intervals                                Axis  Rate:       88                           P:          47  MA:         137                          QRS:        3  QRSD:       103                          T:          28  QT:         368  QTc:        446    Interpretive Statements  Sinus rhythm  Abnormal R-wave progression, early transition  Compared to ECG 2011 13:51:23  No significant changes  Electronically Signed On 2021 10:41:06 PDT by Trinh Lamb        All labs reviewed by me.    EKG  12 lead EKG interpreted by me as noted above.    RADIOLOGY  CT-HEAD W/O   Final Result      Negative noncontrast CT scan of the head / brain.      DX-CHEST-PORTABLE (1 VIEW)   Final Result      No acute cardiac or pulmonary abnormalities are identified.        The radiologist's interpretation of all radiological studies have been reviewed by me.    COURSE & MEDICAL DECISION MAKING  Nursing notes, VS, PMSFHx reviewed in chart.   Patient presents with complaints of dizziness.  There is no nystagmus on exam.  Patient occasionally states he feels like the room is  spinning and occasionally states he feels like it is in a pass out.  He has a nonfocal neurologic exam.  Normal gait.  No signs of coordination issues.  Speaking in clear sentences.  Not complaining of any palpitations.  No arrhythmia on the monitor.  At this point it is unclear what is causing his dizziness.  He did recently receive the Covid vaccine and therefore this could be a reaction.  He has not had laboratory studies done in over a year.  No history of diabetes.  Blood pressure has been elevated recently although his current blood pressure is in the normal range and I do not feel needs emergent treatment.  Will evaluate labs for possible endorgan damage from his blood pressure.  No current chest pain or shortness of breath.    8:12 AM Patient seen and examined at bedside. The patient presents with for dizziness and the differential diagnosis includes but is not limited to cardiac etiology, stroke, reaction to vaccine, anemia, electrolyte imbalance. Ordered for EKG, estimated GFR, troponin, CMP, CBC with differential, dx-chest, and ct-head to evaluate. Patient was treated with Antivert 25 mg for his symptoms.    Patient was given meclizine without much relief in his symptoms.  EKG does not show evidence of arrhythmia or MI.  Laboratory studies do not suggest anemia or electrolyte abnormality.  Troponin was normal.  Head CT was normal.  However CT is not graded seeing cerebellar findings.  Discussed different treatment strategy with the family and patient.  This point patient will be hospitalized for further evaluation of his dizziness.      11:41 AM I discussed the patient's case and the above findings with Dr. Foy (hospitalist) who agreed to evaluate the patient for hospitalization.    DISPOSITION:  Patient will be hospitalized by Dr. Foy in guarded condition.      FINAL IMPRESSION  1. Dizziness          Quincy FLORIAN (Scribe), am scribing for, and in the presence of, Trinh Glynn,  M.D..    Electronically signed by: Quincy Partida (Scribe), 4/13/2021    I, Trinh Glynn M.D. personally performed the services described in this documentation, as scribed by Quincy Partida in my presence, and it is both accurate and complete.  C  The note accurately reflects work and decisions made by me.  Trinh Glynn M.D.  4/13/2021  1:49 PM

## 2021-04-14 ENCOUNTER — APPOINTMENT (OUTPATIENT)
Dept: RADIOLOGY | Facility: MEDICAL CENTER | Age: 51
DRG: 149 | End: 2021-04-14
Attending: INTERNAL MEDICINE
Payer: COMMERCIAL

## 2021-04-14 VITALS
RESPIRATION RATE: 15 BRPM | SYSTOLIC BLOOD PRESSURE: 147 MMHG | HEIGHT: 68 IN | DIASTOLIC BLOOD PRESSURE: 93 MMHG | WEIGHT: 230 LBS | BODY MASS INDEX: 34.86 KG/M2 | OXYGEN SATURATION: 96 % | TEMPERATURE: 97.8 F | HEART RATE: 87 BPM

## 2021-04-14 PROBLEM — R42 DIZZINESS: Status: RESOLVED | Noted: 2019-06-03 | Resolved: 2021-04-14

## 2021-04-14 PROBLEM — R06.09 DYSPNEA ON EXERTION: Status: RESOLVED | Noted: 2019-06-03 | Resolved: 2021-04-14

## 2021-04-14 LAB
ALBUMIN SERPL BCP-MCNC: 4.2 G/DL (ref 3.2–4.9)
ALBUMIN/GLOB SERPL: 1.4 G/DL
ALP SERPL-CCNC: 70 U/L (ref 30–99)
ALT SERPL-CCNC: 76 U/L (ref 2–50)
ANION GAP SERPL CALC-SCNC: 7 MMOL/L (ref 7–16)
AST SERPL-CCNC: 39 U/L (ref 12–45)
BASOPHILS # BLD AUTO: 0.8 % (ref 0–1.8)
BASOPHILS # BLD: 0.05 K/UL (ref 0–0.12)
BILIRUB SERPL-MCNC: 0.4 MG/DL (ref 0.1–1.5)
BUN SERPL-MCNC: 16 MG/DL (ref 8–22)
CALCIUM SERPL-MCNC: 9.5 MG/DL (ref 8.5–10.5)
CHLORIDE SERPL-SCNC: 100 MMOL/L (ref 96–112)
CHOLEST SERPL-MCNC: 254 MG/DL (ref 100–199)
CO2 SERPL-SCNC: 25 MMOL/L (ref 20–33)
CREAT SERPL-MCNC: 1.24 MG/DL (ref 0.5–1.4)
EOSINOPHIL # BLD AUTO: 0.24 K/UL (ref 0–0.51)
EOSINOPHIL NFR BLD: 3.8 % (ref 0–6.9)
ERYTHROCYTE [DISTWIDTH] IN BLOOD BY AUTOMATED COUNT: 44.4 FL (ref 35.9–50)
GLOBULIN SER CALC-MCNC: 2.9 G/DL (ref 1.9–3.5)
GLUCOSE SERPL-MCNC: 128 MG/DL (ref 65–99)
HCT VFR BLD AUTO: 47.4 % (ref 42–52)
HDLC SERPL-MCNC: 33 MG/DL
HGB BLD-MCNC: 15.7 G/DL (ref 14–18)
IMM GRANULOCYTES # BLD AUTO: 0.02 K/UL (ref 0–0.11)
IMM GRANULOCYTES NFR BLD AUTO: 0.3 % (ref 0–0.9)
LDLC SERPL CALC-MCNC: 161 MG/DL
LYMPHOCYTES # BLD AUTO: 2.33 K/UL (ref 1–4.8)
LYMPHOCYTES NFR BLD: 37.3 % (ref 22–41)
MCH RBC QN AUTO: 29.8 PG (ref 27–33)
MCHC RBC AUTO-ENTMCNC: 33.1 G/DL (ref 33.7–35.3)
MCV RBC AUTO: 89.9 FL (ref 81.4–97.8)
MONOCYTES # BLD AUTO: 0.66 K/UL (ref 0–0.85)
MONOCYTES NFR BLD AUTO: 10.6 % (ref 0–13.4)
NEUTROPHILS # BLD AUTO: 2.95 K/UL (ref 1.82–7.42)
NEUTROPHILS NFR BLD: 47.2 % (ref 44–72)
NRBC # BLD AUTO: 0 K/UL
NRBC BLD-RTO: 0 /100 WBC
PLATELET # BLD AUTO: 229 K/UL (ref 164–446)
PMV BLD AUTO: 9.4 FL (ref 9–12.9)
POTASSIUM SERPL-SCNC: 4.1 MMOL/L (ref 3.6–5.5)
PROT SERPL-MCNC: 7.1 G/DL (ref 6–8.2)
RBC # BLD AUTO: 5.27 M/UL (ref 4.7–6.1)
SODIUM SERPL-SCNC: 132 MMOL/L (ref 135–145)
TRIGL SERPL-MCNC: 299 MG/DL (ref 0–149)
WBC # BLD AUTO: 6.3 K/UL (ref 4.8–10.8)

## 2021-04-14 PROCEDURE — 99217 PR OBSERVATION CARE DISCHARGE: CPT | Performed by: STUDENT IN AN ORGANIZED HEALTH CARE EDUCATION/TRAINING PROGRAM

## 2021-04-14 PROCEDURE — 80061 LIPID PANEL: CPT

## 2021-04-14 PROCEDURE — G0378 HOSPITAL OBSERVATION PER HR: HCPCS

## 2021-04-14 PROCEDURE — A9270 NON-COVERED ITEM OR SERVICE: HCPCS | Performed by: INTERNAL MEDICINE

## 2021-04-14 PROCEDURE — 80053 COMPREHEN METABOLIC PANEL: CPT

## 2021-04-14 PROCEDURE — 700102 HCHG RX REV CODE 250 W/ 637 OVERRIDE(OP): Performed by: INTERNAL MEDICINE

## 2021-04-14 PROCEDURE — 70551 MRI BRAIN STEM W/O DYE: CPT

## 2021-04-14 PROCEDURE — 85025 COMPLETE CBC W/AUTO DIFF WBC: CPT

## 2021-04-14 RX ORDER — AMLODIPINE BESYLATE 5 MG/1
5 TABLET ORAL DAILY
Qty: 30 TABLET | Refills: 3 | Status: SHIPPED | OUTPATIENT
Start: 2021-04-15 | End: 2021-07-07 | Stop reason: SDUPTHER

## 2021-04-14 RX ORDER — MECLIZINE HCL 12.5 MG/1
12.5 TABLET ORAL 3 TIMES DAILY PRN
Qty: 30 TABLET | Refills: 0 | Status: SHIPPED | OUTPATIENT
Start: 2021-04-14

## 2021-04-14 RX ORDER — ATORVASTATIN CALCIUM 20 MG/1
20 TABLET, FILM COATED ORAL DAILY
Qty: 30 TABLET | Refills: 3 | Status: SHIPPED | OUTPATIENT
Start: 2021-04-14 | End: 2021-07-07 | Stop reason: SDUPTHER

## 2021-04-14 RX ADMIN — OMEPRAZOLE 20 MG: 20 CAPSULE, DELAYED RELEASE ORAL at 06:08

## 2021-04-14 RX ADMIN — AMLODIPINE BESYLATE 5 MG: 5 TABLET ORAL at 06:08

## 2021-04-14 ASSESSMENT — PAIN DESCRIPTION - PAIN TYPE: TYPE: ACUTE PAIN

## 2021-04-14 NOTE — DISCHARGE SUMMARY
Discharge Summary    CHIEF COMPLAINT ON ADMISSION  Chief Complaint   Patient presents with   • Dizziness     Dizzy and nauseated since sat.  First civd mikala last tues.  Went to urgent care yesterday and /100  Told to monitor it. Today 168/112 and still feel dizzy   • Hypertension       Reason for Admission  Dizziness     Admission Date  4/13/2021    CODE STATUS  Full Code    HPI & HOSPITAL COURSE  50 y.o. male who presented 4/13/2021 with a past medical history of hypertension, GERD, obesity that comes to the ED complaining of dizziness.  The patient states that he was in his usual state of health up until last Saturday (4/10/21) while he was doing yard work he started complaining of dizziness.  The patient states that ever since he has had waves of dizziness that accentuate whenever he stands up.  The patient mentions that he has been told in the past that he has had vertigo but never treated.  However he mentions that this episode is way worse than in the past.  The patient states that when he is having his dizzy spells he has blurry vision that goes away when he sits down for a while.  The patient denies chest pain, abdominal pain, constipation, diarrhea or any other focal neurological deficit.     Of note, the patient was found to be hypertensive with a systolic blood pressure in the 170s on admission, with a repeat systolic blood pressure in the 160s.  The patient states that he has noticed that his blood pressure has been more elevated than usual.  He reports systolic blood pressure readings at his house in the 150s and 160s.  He has not taken high blood pressure medication in the past, and was previously treated with weight loss, diet and lifestyle modifications.    Patient was admitted for further workup. ECHO obtained and showed normal EF 60% without valvular abnormalities. MRI brain obtained which was within normal limits without evidence of stroke, hemorrhage, or mass. Patient was started on  Amlodipine 5mg with better control of BP.    On day of discharge patient felt well and complete resolution of dizziness. No dizziness since presentation to the ED. Likely patient has BPPV and was given instructions on Epley maneuver. Unclear if dizziness due to hypertension or if hypertension due to nervousness or stress from these episodes. Patient felt well and wanted to be discharged.    He will be discharged on amlodipine 5mg, will monitor BP at home and can take 10mg daily should his BP not be controlled at home. Patient lipid panel showed mixed hyperlipidemia with  and triglycerides 299. Patient was agreeable to starting moderate dose statin. He was prescribed meclizine prn. Patient has good outpatient support and will follow up with his PCP.    Therefore, he is discharged in good and stable condition to home with close outpatient follow-up.    The patient recovered much more quickly than anticipated on admission.    Discharge Date  4/14/2021    FOLLOW UP ITEMS POST DISCHARGE  Follow up with PCP for BP management, HLD, and screen for DM    DISCHARGE DIAGNOSES  Principal Problem (Resolved):    Dizziness POA: Yes  Active Problems:    Essential hypertension POA: Yes    GERD (gastroesophageal reflux disease) POA: Yes    Obesity (BMI 30-39.9) POA: Yes    ALT (SGPT) level raised POA: Yes    Hyperglycemia POA: Yes      FOLLOW UP  Future Appointments   Date Time Provider Department Center   4/24/2021  2:30 PM COVID-19 [facility] None     Solo Omer M.D.  [address] [place] [address]  Jim HUBER [id number]  [phone]    Schedule an appointment as soon as possible for a visit        MEDICATIONS ON DISCHARGE     Medication List      START taking these medications      Instructions   amLODIPine 5 MG Tabs  Start taking on: April 15, 2021  Commonly known as: NORVASC   Take 1 tablet by mouth every day.  Dose: 5 mg     atorvastatin 20 MG Tabs  Commonly known as: LIPITOR   Take 1 tablet by mouth  every day.  Dose: 20 mg     meclizine 12.5 MG Tabs  Commonly known as: ANTIVERT   Take 1 tablet by mouth 3 times a day as needed (dizziness).  Dose: 12.5 mg        CHANGE how you take these medications      Instructions   omeprazole 20 MG delayed-release capsule  What changed: when to take this  Commonly known as: PRILOSEC   Take 1 Cap by mouth 2 times a day.  Dose: 20 mg        CONTINUE taking these medications      Instructions   acetaminophen 500 MG Tabs  Commonly known as: TYLENOL   Take 1,000 mg by mouth every 6 hours as needed. Indications: Pain  Dose: 1,000 mg     Loratadine 10 MG Caps  Commonly known as: Claritin   Take 10 mg by mouth every day.  Dose: 10 mg     ondansetron 4 MG Tbdp  Commonly known as: ZOFRAN ODT   Take 1 tablet by mouth every 6 hours as needed for Nausea.  Dose: 4 mg        STOP taking these medications    fenofibrate 160 MG tablet  Commonly known as: TRIGLIDE            Allergies  Allergies   Allergen Reactions   • Peanuts [Peanut Oil] Anaphylaxis       DIET  Orders Placed This Encounter   Procedures   • Diet Order Diet: Cardiac (low sodium diet)     Standing Status:   Standing     Number of Occurrences:   1     Order Specific Question:   Diet:     Answer:   Cardiac [6]     Comments:   low sodium diet       ACTIVITY  As tolerated.  Weight bearing as tolerated    CONSULTATIONS  None    PROCEDURES  MR-BRAIN-W/O   Final Result      MRI of the brain without contrast within normal limits.      EC-ECHOCARDIOGRAM COMPLETE W/O CONT   Final Result      CT-HEAD W/O   Final Result      Negative noncontrast CT scan of the head / brain.      DX-CHEST-PORTABLE (1 VIEW)   Final Result      No acute cardiac or pulmonary abnormalities are identified.        ECHO 4/13: Normal left ventricular chamber size.  Left ventricular ejection fraction is visually estimated to be 60%.  No significant valve abnormalities.     LABORATORY  Lab Results   Component Value Date    SODIUM 132 (L) 04/14/2021    POTASSIUM 4.1  04/14/2021    CHLORIDE 100 04/14/2021    CO2 25 04/14/2021    GLUCOSE 128 (H) 04/14/2021    BUN 16 04/14/2021    CREATININE 1.24 04/14/2021        Lab Results   Component Value Date    WBC 6.3 04/14/2021    HEMOGLOBIN 15.7 04/14/2021    HEMATOCRIT 47.4 04/14/2021    PLATELETCT 229 04/14/2021        Total time of the discharge process exceeds 33 minutes.

## 2021-04-14 NOTE — PROGRESS NOTES
Pt alert and oriented x4, on room air. PIV dressing CDI. V/S taken. Pt denies dizziness, continued on standby assistance for fall precaution. No report of pain/discomfort. Pt up self to bathroom, bed alarm on, doris bell light within reach. Will continue to monitor.

## 2021-04-14 NOTE — PROGRESS NOTES
Pt taken down to [facility]. Chart scanned into system and content of chart taken to [facility]. Pt has all belongings with him at this time.

## 2021-04-14 NOTE — PROGRESS NOTES
Discharge orders received.  Patient arrived to the discharge lounge.  PIV discontinued by bedside RN prior to patient's arrival to the lounge.  Instructions, prescriptions and education given to patient.  Follow up appointments discussed.  Patient verbalized understanding of dc instructions and prescriptions.  Patient signed discharge instructions.  Patient verbalized he had all belongings with him.  Patient left via walking to the ER parking lot where he was picked up in a personal vehicle.  Wished patient a pleasant day.

## 2021-04-14 NOTE — DISCHARGE INSTRUCTIONS
Discharge Instructions    Discharged to home by car with friend. Discharged via wheelchair, hospital escort: Yes.  Special equipment needed: Not Applicable    Be sure to schedule a follow-up appointment with your primary care doctor or any specialists as instructed.     Discharge Plan: Follow up with your primary care provider.  Take your medications as prescribed.  Return to the ER as needed.     I understand that a diet low in cholesterol, fat, and sodium is recommended for good health. Unless I have been given specific instructions below for another diet, I accept this instruction as my diet prescription.     Other diet: Healthy diet    Special Instructions:   Benign Positional Vertigo  Vertigo is the feeling that you or your surroundings are moving when they are not. Benign positional vertigo is the most common form of vertigo. This is usually a harmless condition (benign). This condition is positional. This means that symptoms are triggered by certain movements and positions.  This condition can be dangerous if it occurs while you are doing something that could cause harm to you or others. This includes activities such as driving or operating machinery.  What are the causes?  In many cases, the cause of this condition is not known. It may be caused by a disturbance in an area of the inner ear that helps your brain to sense movement and balance. This disturbance can be caused by:  · Viral infection (labyrinthitis).  · Head injury.  · Repetitive motion, such as jumping, dancing, or running.  What increases the risk?  You are more likely to develop this condition if:  · You are a woman.  · You are 50 years of age or older.  What are the signs or symptoms?  Symptoms of this condition usually happen when you move your head or your eyes in different directions. Symptoms may start suddenly, and usually last for less than a minute. They include:  · Loss of balance and falling.  · Feeling like you are spinning or  moving.  · Feeling like your surroundings are spinning or moving.  · Nausea and vomiting.  · Blurred vision.  · Dizziness.  · Involuntary eye movement (nystagmus).  Symptoms can be mild and cause only minor problems, or they can be severe and interfere with daily life. Episodes of benign positional vertigo may return (recur) over time. Symptoms may improve over time.  How is this diagnosed?  This condition may be diagnosed based on:  · Your medical history.  · Physical exam of the head, neck, and ears.  · Tests, such as:  ? MRI.  ? CT scan.  ? Eye movement tests. Your health care provider may ask you to change positions quickly while he or she watches you for symptoms of benign positional vertigo, such as nystagmus. Eye movement may be tested with a variety of exams that are designed to evaluate or stimulate vertigo.  ? An electroencephalogram (EEG). This records electrical activity in your brain.  ? Hearing tests.  You may be referred to a health care provider who specializes in ear, nose, and throat (ENT) problems (otolaryngologist) or a provider who specializes in disorders of the nervous system (neurologist).  How is this treated?    This condition may be treated in a session in which your health care provider moves your head in specific positions to adjust your inner ear back to normal. Treatment for this condition may take several sessions. Surgery may be needed in severe cases, but this is rare.   In some cases, benign positional vertigo may resolve on its own in 2-4 weeks.  Follow these instructions at home:  Safety  · Move slowly. Avoid sudden body or head movements or certain positions, as told by your health care provider.  · Avoid driving until your health care provider says it is safe for you to do so.  · Avoid operating heavy machinery until your health care provider says it is safe for you to do so.  · Avoid doing any tasks that would be dangerous to you or others if vertigo occurs.  · If you have  "trouble walking or keeping your balance, try using a cane for stability. If you feel dizzy or unstable, sit down right away.  · Return to your normal activities as told by your health care provider. Ask your health care provider what activities are safe for you.  General instructions  · Take over-the-counter and prescription medicines only as told by your health care provider.  · Drink enough fluid to keep your urine pale yellow.  · Keep all follow-up visits as told by your health care provider. This is important.  Contact a health care provider if:  · You have a fever.  · Your condition gets worse or you develop new symptoms.  · Your family or friends notice any behavioral changes.  · You have nausea or vomiting that gets worse.  · You have numbness or a \"pins and needles\" sensation.  Get help right away if you:  · Have difficulty speaking or moving.  · Are always dizzy.  · Faint.  · Develop severe headaches.  · Have weakness in your legs or arms.  · Have changes in your hearing or vision.  · Develop a stiff neck.  · Develop sensitivity to light.  Summary  · Vertigo is the feeling that you or your surroundings are moving when they are not. Benign positional vertigo is the most common form of vertigo.  · The cause of this condition is not known. It may be caused by a disturbance in an area of the inner ear that helps your brain to sense movement and balance.  · Symptoms include loss of balance and falling, feeling that you or your surroundings are moving, nausea and vomiting, and blurred vision.  · This condition can be diagnosed based on symptoms, physical exam, and other tests, such as MRI, CT scan, eye movement tests, and hearing tests.  · Follow safety instructions as told by your health care provider. You will also be told when to contact your health care provider in case of problems.  This information is not intended to replace advice given to you by your health care provider. Make sure you discuss any " questions you have with your health care provider.      Dizziness  Dizziness is a common problem. It is a feeling of unsteadiness or light-headedness. You may feel like you are about to faint. Dizziness can lead to injury if you stumble or fall. Anyone can become dizzy, but dizziness is more common in older adults. This condition can be caused by a number of things, including medicines, dehydration, or illness.  Follow these instructions at home:  Eating and drinking  · Drink enough fluid to keep your urine clear or pale yellow. This helps to keep you from becoming dehydrated. Try to drink more clear fluids, such as water.  · Do not drink alcohol.  · Limit your caffeine intake if told to do so by your health care provider. Check ingredients and nutrition facts to see if a food or beverage contains caffeine.  · Limit your salt (sodium) intake if told to do so by your health care provider. Check ingredients and nutrition facts to see if a food or beverage contains sodium.  Activity  · Avoid making quick movements.  ? Rise slowly from chairs and steady yourself until you feel okay.  ? In the morning, first sit up on the side of the bed. When you feel okay, stand slowly while you hold onto something until you know that your balance is fine.  · If you need to  one place for a long time, move your legs often. Tighten and relax the muscles in your legs while you are standing.  · Do not drive or use heavy machinery if you feel dizzy.  · Avoid bending down if you feel dizzy. Place items in your home so that they are easy for you to reach without leaning over.  Lifestyle  · Do not use any products that contain nicotine or tobacco, such as cigarettes and e-cigarettes. If you need help quitting, ask your health care provider.  · Try to reduce your stress level by using methods such as yoga or meditation. Talk with your health care provider if you need help to manage your stress.  General instructions  · Watch your  dizziness for any changes.  · Take over-the-counter and prescription medicines only as told by your health care provider. Talk with your health care provider if you think that your dizziness is caused by a medicine that you are taking.  · Tell a friend or a family member that you are feeling dizzy. If he or she notices any changes in your behavior, have this person call your health care provider.  · Keep all follow-up visits as told by your health care provider. This is important.  Contact a health care provider if:  · Your dizziness does not go away.  · Your dizziness or light-headedness gets worse.  · You feel nauseous.  · You have reduced hearing.  · You have new symptoms.  · You are unsteady on your feet or you feel like the room is spinning.  Get help right away if:  · You vomit or have diarrhea and are unable to eat or drink anything.  · You have problems talking, walking, swallowing, or using your arms, hands, or legs.  · You feel generally weak.  · You are not thinking clearly or you have trouble forming sentences. It may take a friend or family member to notice this.  · You have chest pain, abdominal pain, shortness of breath, or sweating.  · Your vision changes.  · You have any bleeding.  · You have a severe headache.  · You have neck pain or a stiff neck.  · You have a fever.  These symptoms may represent a serious problem that is an emergency. Do not wait to see if the symptoms will go away. Get medical help right away. Call your local emergency services (911 in the [place] Do not drive yourself to the hospital.  Summary  · Dizziness is a feeling of unsteadiness or light-headedness. This condition can be caused by a number of things, including medicines, dehydration, or illness.  · Anyone can become dizzy, but dizziness is more common in older adults.  · Drink enough fluid to keep your urine clear or pale yellow. Do not drink alcohol.  · Avoid making quick movements if you feel dizzy. Monitor your  dizziness for any changes.  This information is not intended to replace advice given to you by your health care provider. Make sure you discuss any questions you have with your health care provider.    How to Perform the Epley Maneuver  The Epley maneuver is an exercise that relieves symptoms of vertigo. Vertigo is the feeling that you or your surroundings are moving when they are not. When you feel vertigo, you may feel like the room is spinning and have trouble walking. Dizziness is a little different than vertigo. When you are dizzy, you may feel unsteady or light-headed.  You can do this maneuver at home whenever you have symptoms of vertigo. You can do it up to 3 times a day until your symptoms go away.  Even though the Epley maneuver may relieve your vertigo for a few weeks, it is possible that your symptoms will return. This maneuver relieves vertigo, but it does not relieve dizziness.  What are the risks?  If it is done correctly, the Epley maneuver is considered safe. Sometimes it can lead to dizziness or nausea that goes away after a short time. If you develop other symptoms, such as changes in vision, weakness, or numbness, stop doing the maneuver and call your health care provider.  How to perform the Epley maneuver  1. Sit on the edge of a bed or table with your back straight and your legs extended or hanging over the edge of the bed or table.  2. Turn your head USP toward the affected ear or side.  3. Lie backward quickly with your head turned until you are lying flat on your back. You may want to position a pillow under your shoulders.  4. Hold this position for 30 seconds. You may experience an attack of vertigo. This is normal.  5. Turn your head to the opposite direction until your unaffected ear is facing the floor.  6. Hold this position for 30 seconds. You may experience an attack of vertigo. This is normal. Hold this position until the vertigo stops.  7. Turn your whole body to the same  side as your head. Hold for another 30 seconds.  8. Sit back up.  You can repeat this exercise up to 3 times a day.  Follow these instructions at home:  · After doing the Epley maneuver, you can return to your normal activities.  · Ask your health care provider if there is anything you should do at home to prevent vertigo. He or she may recommend that you:  ? Keep your head raised (elevated) with two or more pillows while you sleep.  ? Do not sleep on the side of your affected ear.  ? Get up slowly from bed.  ? Avoid sudden movements during the day.  ? Avoid extreme head movement, like looking up or bending over.  Contact a health care provider if:  · Your vertigo gets worse.  · You have other symptoms, including:  ? Nausea.  ? Vomiting.  ? Headache.  Get help right away if:  · You have vision changes.  · You have a severe or worsening headache or neck pain.  · You cannot stop vomiting.  · You have new numbness or weakness in any part of your body.  Summary  · Vertigo is the feeling that you or your surroundings are moving when they are not.  · The Epley maneuver is an exercise that relieves symptoms of vertigo.  · If the Epley maneuver is done correctly, it is considered safe. You can do it up to 3 times a day.  This information is not intended to replace advice given to you by your health care provider. Make sure you discuss any questions you have with your health care provider.    · Is patient discharged on Warfarin / Coumadin?   No     Depression / Suicide Risk    As you are discharged from this [facility] facility, it is important to learn how to keep safe from harming yourself.    Recognize the warning signs:  · Abrupt changes in personality, positive or negative- including increase in energy   · Giving away possessions  · Change in eating patterns- significant weight changes-  positive or negative  · Change in sleeping patterns- unable to sleep or sleeping all the time   · Unwillingness or inability to  communicate  · Depression  · Unusual sadness, discouragement and loneliness  · Talk of wanting to die  · Neglect of personal appearance   · Rebelliousness- reckless behavior  · Withdrawal from people/activities they love  · Confusion- inability to concentrate     If you or a loved one observes any of these behaviors or has concerns about self-harm, here's what you can do:  · Talk about it- your feelings and reasons for harming yourself  · Remove any means that you might use to hurt yourself (examples: pills, rope, extension cords, firearm)  · Get professional help from the community (Mental Health, Substance Abuse, psychological counseling)  · Do not be alone:Call your Safe Contact- someone whom you trust who will be there for you.  · Call your local CRISIS HOTLINE 396-1130 or 591-906-6351  · Call your local Children's Mobile Crisis Response Team [place] (877) 742-0496 or www.[employer]  · Call the toll free National Suicide Prevention Hotlines   · National Suicide Prevention Lifeline 043-199-NTPN (1564)  · National Hope Line Network 800-SUICIDE (095-2675)

## 2021-04-14 NOTE — PROGRESS NOTES
Report received and assumed care of pt at the change of shift.Pt is A&Ox4. Pt is resting in bed, no signs of labored breathing or pain. Pt on RA. Bed remains in lowest position & locked, and bed alarm has been refused.  Pt given non slip socks. Pt updated on plan of care for the shift. Pt moved off unit at 0740 for MRI via transport.

## 2021-04-14 NOTE — PROGRESS NOTES
2 RN Skin Check    2 RN skin check complete with PAULINA Velazquez  Devices in place: PIV  Skin assessed under devices: yes.  Confirmed pressure ulcers found on: n/a.  New potential pressure ulcers noted on n/a. Wound consult placed N/A.  The following interventions in place pillows in use for support and repositioning, encouraged to ambulate as tolerated. Encouraged frequent repositioning     Generalized skin pink dry and intact. Blanching throughout. Bilat ankles are blanching and intact, bilat ears are pink and blanching. Sacrum is red, intact and blanching

## 2021-04-14 NOTE — CARE PLAN
Problem: Safety  Goal: Will remain free from falls  Outcome: PROGRESSING AS EXPECTED  Note: Pt bed alarm is on, on standby assistance when oob for pt dizziness.     Problem: Knowledge Deficit  Goal: Knowledge of disease process/condition, treatment plan, diagnostic tests, and medications will improve  Note: Pt updated on POC, pt verbalized understanding.

## 2021-04-30 ENCOUNTER — IMMUNIZATION (OUTPATIENT)
Dept: FAMILY PLANNING/WOMEN'S HEALTH CLINIC | Facility: IMMUNIZATION CENTER | Age: 51
End: 2021-04-30
Payer: COMMERCIAL

## 2021-04-30 DIAGNOSIS — Z23 ENCOUNTER FOR VACCINATION: Primary | ICD-10-CM

## 2021-04-30 PROCEDURE — 0002A PFIZER SARS-COV-2 VACCINE: CPT

## 2021-04-30 PROCEDURE — 91300 PFIZER SARS-COV-2 VACCINE: CPT

## 2021-07-08 RX ORDER — OMEPRAZOLE 20 MG/1
20 CAPSULE, DELAYED RELEASE ORAL 2 TIMES DAILY
Qty: 60 CAPSULE | Refills: 0 | Status: SHIPPED | OUTPATIENT
Start: 2021-07-08 | End: 2021-07-08 | Stop reason: SDUPTHER

## 2021-07-08 RX ORDER — AMLODIPINE BESYLATE 5 MG/1
5 TABLET ORAL DAILY
Qty: 30 TABLET | Refills: 0 | Status: SHIPPED | OUTPATIENT
Start: 2021-07-08 | End: 2021-07-08 | Stop reason: SDUPTHER

## 2021-07-08 RX ORDER — OMEPRAZOLE 20 MG/1
20 CAPSULE, DELAYED RELEASE ORAL 2 TIMES DAILY
Qty: 180 CAPSULE | Refills: 0 | Status: SHIPPED | OUTPATIENT
Start: 2021-07-08 | End: 2021-07-19 | Stop reason: SDUPTHER

## 2021-07-08 RX ORDER — ATORVASTATIN CALCIUM 20 MG/1
20 TABLET, FILM COATED ORAL 2 TIMES DAILY
Qty: 180 TABLET | Refills: 0 | Status: SHIPPED | OUTPATIENT
Start: 2021-07-08 | End: 2021-07-19 | Stop reason: SDUPTHER

## 2021-07-08 RX ORDER — ATORVASTATIN CALCIUM 20 MG/1
20 TABLET, FILM COATED ORAL DAILY
Qty: 30 TABLET | Refills: 0 | Status: SHIPPED | OUTPATIENT
Start: 2021-07-08 | End: 2021-07-08 | Stop reason: SDUPTHER

## 2021-07-08 RX ORDER — AMLODIPINE BESYLATE 5 MG/1
5 TABLET ORAL 2 TIMES DAILY
Qty: 180 TABLET | Refills: 0 | Status: SHIPPED | OUTPATIENT
Start: 2021-07-08 | End: 2021-07-19 | Stop reason: SDUPTHER

## 2021-07-19 ENCOUNTER — OFFICE VISIT (OUTPATIENT)
Dept: MEDICAL GROUP | Facility: MEDICAL CENTER | Age: 51
End: 2021-07-19
Payer: COMMERCIAL

## 2021-07-19 VITALS
DIASTOLIC BLOOD PRESSURE: 82 MMHG | OXYGEN SATURATION: 98 % | SYSTOLIC BLOOD PRESSURE: 124 MMHG | BODY MASS INDEX: 35.31 KG/M2 | RESPIRATION RATE: 16 BRPM | TEMPERATURE: 96.6 F | HEART RATE: 83 BPM | WEIGHT: 228.8 LBS

## 2021-07-19 DIAGNOSIS — E78.2 MIXED HYPERLIPIDEMIA: ICD-10-CM

## 2021-07-19 DIAGNOSIS — I10 ESSENTIAL HYPERTENSION: ICD-10-CM

## 2021-07-19 DIAGNOSIS — K21.9 GASTROESOPHAGEAL REFLUX DISEASE WITHOUT ESOPHAGITIS: ICD-10-CM

## 2021-07-19 DIAGNOSIS — R73.9 HYPERGLYCEMIA: ICD-10-CM

## 2021-07-19 DIAGNOSIS — E66.9 OBESITY (BMI 30-39.9): ICD-10-CM

## 2021-07-19 PROCEDURE — 99214 OFFICE O/P EST MOD 30 MIN: CPT | Performed by: STUDENT IN AN ORGANIZED HEALTH CARE EDUCATION/TRAINING PROGRAM

## 2021-07-19 RX ORDER — AMLODIPINE BESYLATE 5 MG/1
5 TABLET ORAL 2 TIMES DAILY
Qty: 180 TABLET | Refills: 2 | Status: SHIPPED | OUTPATIENT
Start: 2021-07-19 | End: 2022-01-04

## 2021-07-19 RX ORDER — METFORMIN HYDROCHLORIDE 500 MG/1
1000 TABLET, EXTENDED RELEASE ORAL 2 TIMES DAILY
Qty: 240 TABLET | Refills: 3 | Status: SHIPPED | OUTPATIENT
Start: 2021-07-19 | End: 2021-07-19

## 2021-07-19 RX ORDER — METFORMIN HYDROCHLORIDE 500 MG/1
1000 TABLET, EXTENDED RELEASE ORAL 2 TIMES DAILY
Qty: 360 TABLET | Refills: 3 | Status: SHIPPED | OUTPATIENT
Start: 2021-07-19 | End: 2022-12-13 | Stop reason: SDUPTHER

## 2021-07-19 RX ORDER — ATORVASTATIN CALCIUM 20 MG/1
20 TABLET, FILM COATED ORAL 2 TIMES DAILY
Qty: 180 TABLET | Refills: 2 | Status: SHIPPED | OUTPATIENT
Start: 2021-07-19 | End: 2021-08-03

## 2021-07-19 RX ORDER — OMEPRAZOLE 20 MG/1
20 CAPSULE, DELAYED RELEASE ORAL 2 TIMES DAILY
Qty: 180 CAPSULE | Refills: 2 | Status: SHIPPED | OUTPATIENT
Start: 2021-07-19 | End: 2022-06-13 | Stop reason: SDUPTHER

## 2021-07-19 ASSESSMENT — FIBROSIS 4 INDEX: FIB4 SCORE: 1.04

## 2021-07-19 ASSESSMENT — PATIENT HEALTH QUESTIONNAIRE - PHQ9: CLINICAL INTERPRETATION OF PHQ2 SCORE: 0

## 2021-07-19 NOTE — PROGRESS NOTES
Subjective:     CC: Hypertension, hyperlipidemia, hyperglycemia ED follow-up    HPI:   Al presents today for medication refills and ED follow-up. Patient was placed on amlodipine and atorvastatin in the ED on 4/13/2021. Patient recommended to follow-up with primary care. Patient is about to lose insurance due to change of jobs and requesting 6-month refill.    Essential hypertension  Patient placed on amlodipine in the ED due to elevated blood pressure readings. Patient has continued on amlodipine 5 mg once daily since ED visit. Patient states that he continues to take his blood pressure at home with systolic readings in the 120s to 130s. Patient does note some minor swelling in peripherally. Patient states that it is not bothersome at this time and will continue on amlodipine. Patient denies headaches or lightheadedness.    Mixed hyperlipidemia  Patient previously on fenofibrate and fish oil tablets. In ED cholesterol was found to be elevated and patient was placed on atorvastatin 20 mg. Patient has continued on atorvastatin since April.    GERD (gastroesophageal reflux disease)  Patient continues on omeprazole 20 mg daily. Patient states this relates GERD symptoms. Patient states breakthrough after eating spicy food or large meals.    Obesity (BMI 30-39.9)  BMI remains elevated at 35. Patient counseled on lifestyle modification including diet and exercise.    Hyperglycemia  Patient's A1c at 6.8% in April ED visit. Discussed with patient that this is prediabetes and he should continue working on decreasing carbohydrates and sugars. We will start patient on Metformin 500 mg twice daily and follow-up in 6 months for A1c recheck.      Current Outpatient Medications Ordered in Epic   Medication Sig Dispense Refill   • atorvastatin (LIPITOR) 20 MG Tab Take 1 tablet by mouth 2 times a day. 180 tablet 2   • amLODIPine (NORVASC) 5 MG Tab Take 1 tablet by mouth 2 times a day. 180 tablet 2   • omeprazole (PRILOSEC) 20 MG  delayed-release capsule Take 1 capsule by mouth 2 times a day. 180 capsule 2   • metFORMIN ER (GLUCOPHAGE XR) 500 MG TABLET SR 24 HR Take 2 Tablets by mouth 2 times a day. 360 tablet 3   • meclizine (ANTIVERT) 12.5 MG Tab Take 1 tablet by mouth 3 times a day as needed (dizziness). 30 tablet 0   • acetaminophen (TYLENOL) 500 MG Tab Take 1,000 mg by mouth every 6 hours as needed. Indications: Pain       No current Epic-ordered facility-administered medications on file.       Health Maintenance: Completed    ROS:  Gen: no fevers/chills, no changes in weight  ENT: no sore throat, no hearing loss, no bloody nose  Pulm: no sob, no cough  CV: no chest pain, no palpitations  GI: no nausea/vomiting, no diarrhea  : no dysuria  MSk: no myalgias  Skin: no rash  Neuro: no headaches, no numbness/tingling  Heme/Lymph: no easy bruising      Objective:     Exam:  /82 (BP Location: Right arm, Patient Position: Sitting, BP Cuff Size: Adult)   Pulse 83   Temp 35.9 °C (96.6 °F)   Resp 16   Wt 104 kg (228 lb 12.8 oz)   SpO2 98%   BMI 35.31 kg/m²  Body mass index is 35.31 kg/m².    Gen: Alert and oriented, No apparent distress.  Neck: Neck is supple without lymphadenopathy.  Lungs: Normal effort, CTA bilaterally, no wheezes, rhonchi, or rales  CV: Regular rate and rhythm. No murmurs, rubs, or gallops.  Ext: No clubbing, cyanosis, edema.      Assessment & Plan:     51 y.o. male with the following -     1. Essential hypertension  Chronic, stable. Patient started on amlodipine 5 mg in ED 4/21. Patient notes some peripheral swelling but states it is not bothersome and wants to continue on this medication at this time. Blood pressure well controlled today and patient continues to take blood pressure at home.    2. Mixed hyperlipidemia  Chronic, stable. Patient started on atorvastatin 20 mg daily in the ED. We will recheck cholesterol in 6 months follow-up.    3. Gastroesophageal reflux disease without esophagitis  Chronic,  stable. Patient continues on omeprazole 20 mg daily.    4. Obesity (BMI 30-39.9)  Chronic, stable. Discussed with patient the importance of diet and exercise and improving chronic health conditions.    5. Hyperglycemia  Chronic, stable. Patient's recent A1c in the ED at 6.8%. We will start patient on Metformin 500 mg twice daily. Patient encouraged to decrease carbohydrates and sugars. We will follow up in 6 weeks to reevaluate.      Return in about 6 months (around 1/19/2022).    Please note that this dictation was created using voice recognition software. I have made every reasonable attempt to correct obvious errors, but I expect that there are errors of grammar and possibly content that I did not discover before finalizing the note.

## 2021-07-19 NOTE — ASSESSMENT & PLAN NOTE
Patient's A1c at 6.8% in April ED visit. Discussed with patient that this is prediabetes and he should continue working on decreasing carbohydrates and sugars. We will start patient on Metformin 500 mg twice daily and follow-up in 6 months for A1c recheck.

## 2021-07-19 NOTE — ASSESSMENT & PLAN NOTE
Patient previously on fenofibrate and fish oil tablets. In ED cholesterol was found to be elevated and patient was placed on atorvastatin 20 mg. Patient has continued on atorvastatin since April.

## 2021-07-19 NOTE — ASSESSMENT & PLAN NOTE
BMI remains elevated at 35. Patient counseled on lifestyle modification including diet and exercise.

## 2021-07-19 NOTE — ASSESSMENT & PLAN NOTE
Patient continues on omeprazole 20 mg daily. Patient states this relates GERD symptoms. Patient states breakthrough after eating spicy food or large meals.

## 2021-07-19 NOTE — ASSESSMENT & PLAN NOTE
Patient placed on amlodipine in the ED due to elevated blood pressure readings. Patient has continued on amlodipine 5 mg once daily since ED visit. Patient states that he continues to take his blood pressure at home with systolic readings in the 120s to 130s. Patient does note some minor swelling in peripherally. Patient states that it is not bothersome at this time and will continue on amlodipine. Patient denies headaches or lightheadedness.

## 2021-12-27 ENCOUNTER — OFFICE VISIT (OUTPATIENT)
Dept: URGENT CARE | Facility: PHYSICIAN GROUP | Age: 51
End: 2021-12-27
Payer: COMMERCIAL

## 2021-12-27 ENCOUNTER — HOSPITAL ENCOUNTER (OUTPATIENT)
Facility: MEDICAL CENTER | Age: 51
End: 2021-12-27
Attending: PHYSICIAN ASSISTANT
Payer: COMMERCIAL

## 2021-12-27 VITALS
BODY MASS INDEX: 35.63 KG/M2 | HEART RATE: 96 BPM | WEIGHT: 227 LBS | HEIGHT: 67 IN | SYSTOLIC BLOOD PRESSURE: 136 MMHG | DIASTOLIC BLOOD PRESSURE: 84 MMHG | TEMPERATURE: 97.4 F | OXYGEN SATURATION: 95 % | RESPIRATION RATE: 16 BRPM

## 2021-12-27 DIAGNOSIS — R68.89 FLU-LIKE SYMPTOMS: ICD-10-CM

## 2021-12-27 LAB
EXTERNAL QUALITY CONTROL: NORMAL
FLUAV+FLUBV AG SPEC QL IA: NEGATIVE
INT CON NEG: NEGATIVE
INT CON POS: POSITIVE
SARS-COV+SARS-COV-2 AG RESP QL IA.RAPID: NEGATIVE

## 2021-12-27 PROCEDURE — U0003 INFECTIOUS AGENT DETECTION BY NUCLEIC ACID (DNA OR RNA); SEVERE ACUTE RESPIRATORY SYNDROME CORONAVIRUS 2 (SARS-COV-2) (CORONAVIRUS DISEASE [COVID-19]), AMPLIFIED PROBE TECHNIQUE, MAKING USE OF HIGH THROUGHPUT TECHNOLOGIES AS DESCRIBED BY CMS-2020-01-R: HCPCS

## 2021-12-27 PROCEDURE — 87426 SARSCOV CORONAVIRUS AG IA: CPT | Performed by: PHYSICIAN ASSISTANT

## 2021-12-27 PROCEDURE — 99213 OFFICE O/P EST LOW 20 MIN: CPT | Performed by: PHYSICIAN ASSISTANT

## 2021-12-27 PROCEDURE — 87804 INFLUENZA ASSAY W/OPTIC: CPT | Performed by: PHYSICIAN ASSISTANT

## 2021-12-27 PROCEDURE — U0005 INFEC AGEN DETEC AMPLI PROBE: HCPCS

## 2021-12-27 RX ORDER — ASPIRIN 81 MG/1
81 TABLET, CHEWABLE ORAL DAILY
COMMUNITY

## 2021-12-27 ASSESSMENT — ENCOUNTER SYMPTOMS
CHILLS: 1
PALPITATIONS: 0
SORE THROAT: 0
FEVER: 1
SHORTNESS OF BREATH: 0
MYALGIAS: 1
WHEEZING: 0
SPUTUM PRODUCTION: 1
COUGH: 1

## 2021-12-27 ASSESSMENT — FIBROSIS 4 INDEX: FIB4 SCORE: 1.04

## 2021-12-27 NOTE — PROGRESS NOTES
Subjective:   Al Corcoran is a 51 y.o. male who presents today with   Chief Complaint   Patient presents with   • URI     x 24 hours with chest congestion, cough, and low grade fever        URI   This is a new problem. The current episode started yesterday. The problem has been unchanged. Maximum temperature: subjective. Associated symptoms include congestion and coughing. Pertinent negatives include no chest pain, sore throat or wheezing. He has tried nothing for the symptoms. The treatment provided no relief.     I personally donned proper PPE throughout visit today.     PMH:  has a past medical history of Class 1 obesity due to excess calories without serious comorbidity with body mass index (BMI) of 33.0 to 33.9 in adult (6/3/2019), Dyspnea on exertion (6/3/2019), Essential hypertension (6/3/2019), Heart burn (6/3/2019), High cholesterol, High triglycerides, Hypertension, Lightheaded (6/3/2019), and Mixed hyperlipidemia (6/3/2019).  MEDS:   Current Outpatient Medications:   •  aspirin (ASA) 81 MG Chew Tab chewable tablet, Chew 81 mg every day., Disp: , Rfl:   •  atorvastatin (LIPITOR) 20 MG Tab, TAKE 1 TABLET BY MOUTH EVERY DAY, Disp: 90 tablet, Rfl: 3  •  amLODIPine (NORVASC) 5 MG Tab, Take 1 tablet by mouth 2 times a day., Disp: 180 tablet, Rfl: 2  •  omeprazole (PRILOSEC) 20 MG delayed-release capsule, Take 1 capsule by mouth 2 times a day., Disp: 180 capsule, Rfl: 2  •  metFORMIN ER (GLUCOPHAGE XR) 500 MG TABLET SR 24 HR, Take 2 Tablets by mouth 2 times a day., Disp: 360 tablet, Rfl: 3  •  meclizine (ANTIVERT) 12.5 MG Tab, Take 1 tablet by mouth 3 times a day as needed (dizziness)., Disp: 30 tablet, Rfl: 0  •  acetaminophen (TYLENOL) 500 MG Tab, Take 1,000 mg by mouth every 6 hours as needed. Indications: Pain (Patient not taking: Reported on 12/27/2021), Disp: , Rfl:   ALLERGIES:   Allergies   Allergen Reactions   • Peanuts [Peanut Oil] Anaphylaxis     SURGHX:   Past Surgical History:   Procedure  "Laterality Date   • VASECTOMY       SOCHX:  reports that he has never smoked. He has never used smokeless tobacco. He reports current alcohol use. He reports that he does not use drugs.  FH: Reviewed with patient, not pertinent to this visit.       Review of Systems   Constitutional: Positive for chills, fever and malaise/fatigue.   HENT: Positive for congestion. Negative for sore throat.    Respiratory: Positive for cough and sputum production. Negative for shortness of breath and wheezing.    Cardiovascular: Negative for chest pain and palpitations.   Musculoskeletal: Positive for myalgias.        Objective:   /84 (BP Location: Left arm, Patient Position: Sitting, BP Cuff Size: Adult long)   Pulse 96   Temp 36.3 °C (97.4 °F) (Temporal)   Resp 16   Ht 1.702 m (5' 7\")   Wt 103 kg (227 lb)   SpO2 95%   BMI 35.55 kg/m²   Physical Exam  Vitals and nursing note reviewed.   Constitutional:       General: He is not in acute distress.     Appearance: Normal appearance. He is well-developed. He is not ill-appearing or toxic-appearing.   HENT:      Head: Normocephalic and atraumatic.      Right Ear: Hearing, tympanic membrane and ear canal normal.      Left Ear: Hearing and ear canal normal.      Nose: Congestion present.   Cardiovascular:      Rate and Rhythm: Normal rate and regular rhythm.      Heart sounds: Normal heart sounds.   Pulmonary:      Effort: Pulmonary effort is normal.      Breath sounds: Normal breath sounds. No stridor. No wheezing, rhonchi or rales.   Musculoskeletal:      Comments: Normal movement in all 4 extremities   Skin:     General: Skin is warm and dry.   Neurological:      Mental Status: He is alert.      Coordination: Coordination normal.   Psychiatric:         Mood and Affect: Mood normal.     FLU negative  COVID RAPID negative      Assessment/Plan:   Assessment    1. Flu-like symptoms  - POCT Influenza A/B  - POCT SARS-COV Antigen JUNIOR (Symptomatic Only)  - SARS-CoV-2 PCR (24 hour " In-House): Collect NP swab in VTM; Future    Other orders  - aspirin (ASA) 81 MG Chew Tab chewable tablet; Chew 81 mg every day.  Symptoms and presentation most consistent with viral illness at this time.  Discussed CDC guidelines including self isolation at home.   Patient encouraged to get plenty of rest, use OTC tylenol for pain/fever, and drink plenty of fluids.  Did discuss with patient given that his symptoms have only been going on for the last 24 hours would recommend waiting another couple of days before being tested but he would like to be tested today and will repeat testing if results come back negative.  Differential diagnosis, natural history, supportive care, and indications for immediate follow-up discussed.   Patient given instructions and understanding of medications and treatment.    If not improving in 3-5 days, F/U with PCP or return to  if symptoms worsen.    Patient agreeable to plan.      Please note that this dictation was created using voice recognition software. I have made every reasonable attempt to correct obvious errors, but I expect that there are errors of grammar and possibly content that I did not discover before finalizing the note.    Manav Lassiter PA-C

## 2021-12-28 LAB
COVID ORDER STATUS COVID19: NORMAL
SARS-COV-2 RNA RESP QL NAA+PROBE: DETECTED
SPECIMEN SOURCE: ABNORMAL

## 2022-01-11 ENCOUNTER — HOSPITAL ENCOUNTER (OUTPATIENT)
Facility: MEDICAL CENTER | Age: 52
End: 2022-01-11
Attending: STUDENT IN AN ORGANIZED HEALTH CARE EDUCATION/TRAINING PROGRAM
Payer: COMMERCIAL

## 2022-01-11 ENCOUNTER — OFFICE VISIT (OUTPATIENT)
Dept: MEDICAL GROUP | Facility: MEDICAL CENTER | Age: 52
End: 2022-01-11
Payer: COMMERCIAL

## 2022-01-11 VITALS
HEIGHT: 67 IN | SYSTOLIC BLOOD PRESSURE: 116 MMHG | DIASTOLIC BLOOD PRESSURE: 74 MMHG | TEMPERATURE: 97.7 F | HEART RATE: 89 BPM | WEIGHT: 229 LBS | OXYGEN SATURATION: 98 % | RESPIRATION RATE: 16 BRPM | BODY MASS INDEX: 35.94 KG/M2

## 2022-01-11 DIAGNOSIS — I10 ESSENTIAL HYPERTENSION: ICD-10-CM

## 2022-01-11 DIAGNOSIS — U07.1 COVID-19: ICD-10-CM

## 2022-01-11 LAB
EXTERNAL QUALITY CONTROL: NORMAL
SARS-COV+SARS-COV-2 AG RESP QL IA.RAPID: NEGATIVE

## 2022-01-11 PROCEDURE — 99212 OFFICE O/P EST SF 10 MIN: CPT | Mod: CS | Performed by: STUDENT IN AN ORGANIZED HEALTH CARE EDUCATION/TRAINING PROGRAM

## 2022-01-11 PROCEDURE — U0003 INFECTIOUS AGENT DETECTION BY NUCLEIC ACID (DNA OR RNA); SEVERE ACUTE RESPIRATORY SYNDROME CORONAVIRUS 2 (SARS-COV-2) (CORONAVIRUS DISEASE [COVID-19]), AMPLIFIED PROBE TECHNIQUE, MAKING USE OF HIGH THROUGHPUT TECHNOLOGIES AS DESCRIBED BY CMS-2020-01-R: HCPCS

## 2022-01-11 PROCEDURE — 87426 SARSCOV CORONAVIRUS AG IA: CPT | Mod: QW | Performed by: STUDENT IN AN ORGANIZED HEALTH CARE EDUCATION/TRAINING PROGRAM

## 2022-01-11 PROCEDURE — U0005 INFEC AGEN DETEC AMPLI PROBE: HCPCS

## 2022-01-11 RX ORDER — AMLODIPINE BESYLATE 5 MG/1
5 TABLET ORAL
Qty: 90 TABLET | Refills: 3 | Status: SHIPPED | OUTPATIENT
Start: 2022-01-11 | End: 2022-06-13

## 2022-01-11 ASSESSMENT — PATIENT HEALTH QUESTIONNAIRE - PHQ9: CLINICAL INTERPRETATION OF PHQ2 SCORE: 0

## 2022-01-11 ASSESSMENT — FIBROSIS 4 INDEX: FIB4 SCORE: 1.04

## 2022-01-11 NOTE — PROGRESS NOTES
"Subjective:     CC: COVID follow-up    HPI:   Al presents today with    COVID follow-up  Patient diagnosed with COVID on 12/28/2021.  She continues to note shortness of breath and brain fog.  Patient states that he just feels fatigued and is having a slow recovery.  Patient has brain fog.  Patient notes no shortness of breath where he is gasping for air but feels that he is unable to do even small activities without becoming fatigued.  Patient denies chest pain.    Patient requesting repeat testing to evaluate for negative in order to return to work.    Patient strongly encouraged to follow-up for routine visit.      ROS:  Gen: no fevers/chill  Pulm: no sob, no cough  CV: no chest pain, no palpitations  GI: no nausea/vomiting, no diarrhea  Neuro: no headaches      Objective:     Exam:  /74 (BP Location: Left arm, Patient Position: Sitting, BP Cuff Size: Adult)   Pulse 89   Temp 36.5 °C (97.7 °F) (Temporal)   Resp 16   Ht 1.702 m (5' 7\")   Wt 104 kg (229 lb)   SpO2 98%   BMI 35.87 kg/m²  Body mass index is 35.87 kg/m².    Gen: Alert and oriented, No apparent distress.  Neck: Neck is supple without lymphadenopathy.  Lungs: Normal effort, CTA bilaterally, no wheezes, rhonchi, or rales  CV: Regular rate and rhythm. No murmurs, rubs, or gallops.  Ext: No clubbing, cyanosis, edema.      Assessment & Plan:     51 y.o. male with the following -     1. COVID-19  Chronic, stable.  Patient approximately 2 weeks out from recent COVID infection.  Patient continues to note shortness of breath and head fog.  Lungs are clear to auscultation bilaterally.  Patient's vital signs are normal.  Discussed checks x-ray and further evaluation if patient's symptoms persist but discussed symptomatic treatment at this time and rest for recovery.  Rapid POCT COVID test in office negative.  PCR sent for further evaluation.  - POCT SARS-COV Antigen JUNIOR (Symptomatic Only)  - SARS-CoV-2 PCR (24 hour In-House): Collect NP swab in " VTM; Future    2. Essential hypertension  - amLODIPine (NORVASC) 5 MG Tab; Take 1 Tablet by mouth every day.  Dispense: 90 Tablet; Refill: 3    Return in about 4 weeks (around 2/8/2022).    Please note that this dictation was created using voice recognition software. I have made every reasonable attempt to correct obvious errors, but I expect that there are errors of grammar and possibly content that I did not discover before finalizing the note.

## 2022-01-12 DIAGNOSIS — U07.1 COVID-19: ICD-10-CM

## 2022-01-12 LAB — COVID ORDER STATUS COVID19: NORMAL

## 2022-01-13 LAB
SARS-COV-2 RNA RESP QL NAA+PROBE: NOTDETECTED
SPECIMEN SOURCE: NORMAL

## 2022-01-20 DIAGNOSIS — E78.5 DYSLIPIDEMIA: ICD-10-CM

## 2022-01-20 DIAGNOSIS — E78.2 MIXED HYPERLIPIDEMIA: ICD-10-CM

## 2022-01-20 DIAGNOSIS — R74.01 TRANSAMINITIS: ICD-10-CM

## 2022-01-20 DIAGNOSIS — E55.9 HYPOVITAMINOSIS D: ICD-10-CM

## 2022-01-20 DIAGNOSIS — R53.83 FATIGUE, UNSPECIFIED TYPE: ICD-10-CM

## 2022-01-20 DIAGNOSIS — Z12.5 SCREENING FOR MALIGNANT NEOPLASM OF PROSTATE: ICD-10-CM

## 2022-01-20 DIAGNOSIS — R73.01 IFG (IMPAIRED FASTING GLUCOSE): ICD-10-CM

## 2022-02-10 DIAGNOSIS — E11.65 UNCONTROLLED TYPE 2 DIABETES MELLITUS WITH HYPERGLYCEMIA (HCC): ICD-10-CM

## 2022-02-10 LAB
25(OH)D3+25(OH)D2 SERPL-MCNC: 36.6 NG/ML (ref 30–100)
ALBUMIN SERPL-MCNC: 5 G/DL (ref 3.8–4.9)
ALBUMIN/CREAT UR: 125 MG/G CREAT (ref 0–29)
ALBUMIN/GLOB SERPL: 1.8 {RATIO} (ref 1.2–2.2)
ALP SERPL-CCNC: 95 IU/L (ref 44–121)
ALT SERPL-CCNC: 67 IU/L (ref 0–44)
AST SERPL-CCNC: 38 IU/L (ref 0–40)
BASOPHILS # BLD AUTO: 0.1 X10E3/UL (ref 0–0.2)
BASOPHILS NFR BLD AUTO: 1 %
BILIRUB SERPL-MCNC: 0.4 MG/DL (ref 0–1.2)
BUN SERPL-MCNC: 17 MG/DL (ref 6–24)
BUN/CREAT SERPL: 16 (ref 9–20)
CALCIUM SERPL-MCNC: 10.1 MG/DL (ref 8.7–10.2)
CHLORIDE SERPL-SCNC: 100 MMOL/L (ref 96–106)
CHOLEST SERPL-MCNC: 183 MG/DL (ref 100–199)
CO2 SERPL-SCNC: 24 MMOL/L (ref 20–29)
CREAT SERPL-MCNC: 1.09 MG/DL (ref 0.76–1.27)
CREAT UR-MCNC: 127.3 MG/DL
EOSINOPHIL # BLD AUTO: 0.2 X10E3/UL (ref 0–0.4)
EOSINOPHIL NFR BLD AUTO: 3 %
ERYTHROCYTE [DISTWIDTH] IN BLOOD BY AUTOMATED COUNT: 12.9 % (ref 11.6–15.4)
GGT SERPL-CCNC: 119 IU/L (ref 0–65)
GLOBULIN SER CALC-MCNC: 2.8 G/DL (ref 1.5–4.5)
GLUCOSE SERPL-MCNC: 167 MG/DL (ref 65–99)
HBA1C MFR BLD: 8.3 % (ref 4.8–5.6)
HCT VFR BLD AUTO: 45.1 % (ref 37.5–51)
HDLC SERPL-MCNC: 44 MG/DL
HGB BLD-MCNC: 15.8 G/DL (ref 13–17.7)
IMM GRANULOCYTES # BLD AUTO: 0 X10E3/UL (ref 0–0.1)
IMM GRANULOCYTES NFR BLD AUTO: 0 %
IMMATURE CELLS  115398: NORMAL
LABORATORY COMMENT REPORT: ABNORMAL
LDLC SERPL CALC-MCNC: 101 MG/DL (ref 0–99)
LYMPHOCYTES # BLD AUTO: 2.1 X10E3/UL (ref 0.7–3.1)
LYMPHOCYTES NFR BLD AUTO: 25 %
MCH RBC QN AUTO: 30.3 PG (ref 26.6–33)
MCHC RBC AUTO-ENTMCNC: 35 G/DL (ref 31.5–35.7)
MCV RBC AUTO: 87 FL (ref 79–97)
MICROALBUMIN UR-MCNC: 159.6 UG/ML
MONOCYTES # BLD AUTO: 0.8 X10E3/UL (ref 0.1–0.9)
MONOCYTES NFR BLD AUTO: 10 %
MORPHOLOGY BLD-IMP: NORMAL
NEUTROPHILS # BLD AUTO: 5.2 X10E3/UL (ref 1.4–7)
NEUTROPHILS NFR BLD AUTO: 61 %
NRBC BLD AUTO-RTO: NORMAL %
PLATELET # BLD AUTO: 272 X10E3/UL (ref 150–450)
POTASSIUM SERPL-SCNC: 4.3 MMOL/L (ref 3.5–5.2)
PROT SERPL-MCNC: 7.8 G/DL (ref 6–8.5)
PSA SERPL-MCNC: 1.6 NG/ML (ref 0–4)
RBC # BLD AUTO: 5.21 X10E6/UL (ref 4.14–5.8)
SODIUM SERPL-SCNC: 139 MMOL/L (ref 134–144)
TRIGL SERPL-MCNC: 225 MG/DL (ref 0–149)
TSH SERPL DL<=0.005 MIU/L-ACNC: 2.52 UIU/ML (ref 0.45–4.5)
VLDLC SERPL CALC-MCNC: 38 MG/DL (ref 5–40)
WBC # BLD AUTO: 8.4 X10E3/UL (ref 3.4–10.8)

## 2022-02-10 RX ORDER — GLUCOSAMINE HCL/CHONDROITIN SU 500-400 MG
CAPSULE ORAL
Qty: 100 EACH | Refills: 3 | Status: SHIPPED | OUTPATIENT
Start: 2022-02-10 | End: 2022-12-13

## 2022-02-10 RX ORDER — LANCETS 30 GAUGE
EACH MISCELLANEOUS
Qty: 100 EACH | Refills: 0 | Status: SHIPPED | OUTPATIENT
Start: 2022-02-10

## 2022-02-16 DIAGNOSIS — R74.01 TRANSAMINITIS: ICD-10-CM

## 2022-02-16 DIAGNOSIS — R06.02 SOB (SHORTNESS OF BREATH): ICD-10-CM

## 2022-02-25 ENCOUNTER — HOSPITAL ENCOUNTER (OUTPATIENT)
Dept: RADIOLOGY | Facility: MEDICAL CENTER | Age: 52
End: 2022-02-25
Attending: INTERNAL MEDICINE
Payer: COMMERCIAL

## 2022-02-25 DIAGNOSIS — R74.01 TRANSAMINITIS: ICD-10-CM

## 2022-02-25 DIAGNOSIS — R06.02 SOB (SHORTNESS OF BREATH): ICD-10-CM

## 2022-02-25 PROCEDURE — 71046 X-RAY EXAM CHEST 2 VIEWS: CPT

## 2022-02-25 PROCEDURE — 76705 ECHO EXAM OF ABDOMEN: CPT

## 2022-03-09 ENCOUNTER — HOSPITAL ENCOUNTER (OUTPATIENT)
Dept: CARDIOLOGY | Facility: MEDICAL CENTER | Age: 52
End: 2022-03-09
Attending: INTERNAL MEDICINE
Payer: COMMERCIAL

## 2022-03-09 DIAGNOSIS — R06.02 SOB (SHORTNESS OF BREATH): ICD-10-CM

## 2022-03-09 PROCEDURE — 93306 TTE W/DOPPLER COMPLETE: CPT

## 2022-03-10 LAB
LV EJECT FRACT  99904: 65
LV EJECT FRACT MOD 2C 99903: 56.98
LV EJECT FRACT MOD 4C 99902: 71.46
LV EJECT FRACT MOD BP 99901: 66.84

## 2022-03-10 PROCEDURE — 93306 TTE W/DOPPLER COMPLETE: CPT | Mod: 26 | Performed by: INTERNAL MEDICINE

## 2022-03-24 DIAGNOSIS — H81.10 BENIGN PAROXYSMAL POSITIONAL VERTIGO, UNSPECIFIED LATERALITY: ICD-10-CM

## 2022-03-24 DIAGNOSIS — R42 VERTIGO: ICD-10-CM

## 2022-03-28 ENCOUNTER — PHYSICAL THERAPY (OUTPATIENT)
Dept: PHYSICAL THERAPY | Facility: REHABILITATION | Age: 52
End: 2022-03-28
Attending: INTERNAL MEDICINE
Payer: COMMERCIAL

## 2022-03-28 DIAGNOSIS — H83.2X9 VESTIBULAR HYPOFUNCTION, UNSPECIFIED LATERALITY: ICD-10-CM

## 2022-03-28 DIAGNOSIS — R42 VERTIGO: ICD-10-CM

## 2022-03-28 PROCEDURE — 97162 PT EVAL MOD COMPLEX 30 MIN: CPT

## 2022-03-28 ASSESSMENT — ENCOUNTER SYMPTOMS
PAIN SCALE: 0
PAIN SCALE AT LOWEST: 0
PAIN SCALE AT HIGHEST: 10

## 2022-03-28 NOTE — OP THERAPY EVALUATION
Outpatient Physical Therapy  VESTIBULAR EVALUATION    [facility] [address]  [address]  [place] [address]  Phone:  [phone]  Fax:  [phone]    Date of Evaluation: 03/28/2022    Patient: Al Corcoran  YOB: 1970  MRN: [id number]     Referring Provider: [place] [address] [id number]   Referring Diagnosis: Benign paroxysmal positional vertigo, unspecified laterality [H81.10]     Time Calculation                     Chief Complaint: Vertigo    Visit Diagnoses     ICD-10-CM   1. Vestibular hypofunction, unspecified laterality  H83.2X9   2. Vertigo  R42         History of Present Illness:     Mechanism of injury:  Pt reports history of vertigo diagnosed after Covid vaccine and ED visit with high BP.    Recent diagnosis DM2 and hear to determine if dizzy related to vertigo or blood sugars.    Pt reports after prolonged sitting at work he will get dizzy especially when he goes to stand. Symptoms described as lightheaded, unstable, swaying, and occasional room spinning. Unable to report certain description of symptoms with activities. Symptoms last about a minute but he will feel off for hours after. Other activities including head changes, yard work, laying down in the dark can inc symptoms. Pt reports feeling off/sway/instabilty.    This has occurred off and on over the years but intensity has increased recently. .    Symptoms also mild with reading-saw eye doctor a year ago with min change in presciption.    Denies falls or fainting. However, has had periods where he has felt like he would pass out. Happens every couple weeks.     Blood glucose can fluctuate 112-134 Highest can be up to 190. After recent standing episode where symptoms 10/10 glucose was in 160s.    Denies numbness/tingling. Overall min changes in walking unless feeling symptoms.     Meclizine can dec the intensity of his symptoms. Notes improvement in symptoms  "with Claritin as he does have allergies.     Denies head injuries. Has had Covid x 2 with mild symptoms.     ED 4/13/21  \"50 y.o. male who presented 4/13/2021 with a past medical history of hypertension, GERD, obesity that comes to the ED complaining of dizziness.  The patient states that he was in his usual state of health up until last Saturday (4/10/21) while he was doing yard work he started complaining of dizziness.  The patient states that ever since he has had waves of dizziness that accentuate whenever he stands up.  The patient mentions that he has been told in the past that he has had vertigo but never treated.  However he mentions that this episode is way worse than in the past.  The patient states that when he is having his dizzy spells he has blurry vision that goes away when he sits down for a while.  The patient denies chest pain, abdominal pain, constipation, diarrhea or any other focal neurological deficit.     Of note, the patient was found to be hypertensive with a systolic blood pressure in the 170s on admission, with a repeat systolic blood pressure in the 160s.  The patient states that he has noticed that his blood pressure has been more elevated than usual.  He reports systolic blood pressure readings at his house in the 150s and 160s.  He has not taken high blood pressure medication in the past, and was previously treated with weight loss, diet and lifestyle modifications.     Patient was admitted for further workup. ECHO obtained and showed normal EF 60% without valvular abnormalities. MRI brain obtained which was within normal limits without evidence of stroke, hemorrhage, or mass. Patient was started on Amlodipine 5mg with better control of BP.     On day of discharge patient felt well and complete resolution of dizziness. No dizziness since presentation to the ED. Likely patient has BPPV and was given instructions on Epley maneuver. Unclear if dizziness due to hypertension or if " "hypertension due to nervousness or stress from these episodes. Patient felt well and wanted to be discharged.\"    Prior level of function:  Works as medical  at [employer]    Headaches: no headaches      Ear problems:  None    Sleep disturbance:  Not disrupted  Symptoms:     Current symptom ratin    At best symptom ratin    At worst symptom rating:  10 (1 hr on the computer, )  Patient goals:     Other patient goals:  Dec sympyoms      Past Medical History:   Diagnosis Date   • Class 1 obesity due to excess calories without serious comorbidity with body mass index (BMI) of 33.0 to 33.9 in adult 6/3/2019   • Dyspnea on exertion 6/3/2019   • Essential hypertension 6/3/2019   • Heart burn 6/3/2019   • High cholesterol    • High triglycerides    • Hypertension    • Lightheaded 6/3/2019   • Mixed hyperlipidemia 6/3/2019     Past Surgical History:   Procedure Laterality Date   • VASECTOMY       Social History     Tobacco Use   • Smoking status: Never Smoker   • Smokeless tobacco: Never Used   Substance Use Topics   • Alcohol use: Yes     Comment: occ     Family and Occupational History     Socioeconomic History   • Marital status:      Spouse name: Not on file   • Number of children: Not on file   • Years of education: Not on file   • Highest education level: Not on file   Occupational History   • Not on file       Objective:    Gait:     Comments: WNL  Vestibulospinal Exam:     MCTSIB:         Comments: Perform next session d/t time constraints      Oculomotor Exam:         Details: No spontaneous nystagmus central gaze          Details: No spontaneous nystagmus eccentric gaze    No saccadic eye movements    Smooth pursuit present    Convergence:        Normal convergence  Active Range of Motion:     Within functional limits  Limb Ataxia Exam:     Finger-to-Nose:         Intention tremor: none        Overshooting: none    Dysdiadochokinesia: pronation-supination  Strength Exam:     Upper " extremities within functional limits    Lower extremities within functional limits  Vertebrobasilar Exam:    Comments: VAT negative bilateral   Vestibulo-Ocular Exam:     Abnormal head thrust test        Details: corrective saccade on head moving right  BPPV Exam:     Normal Gaastra-Hallpike    Normal straight head-hanging test    Normal roll test    Comments: Pt reported symptoms consistently with change in direction to next position. Symptoms only 1-2 sec, no nystagmus.        Therapeutic Exercises (CPT 43066):       Therapeutic Exercise Summary: HEP: Head circles EO/EC, X1 and X2      Time-based treatments/modalities:             Assessment:     Functional impairments:  Decreased gaze stabilization and decreased utilization of VIS/vest/somato    Assessment details:  Al presents to PT with recent exacerbation of chronic dizziness with head position changes as well as transitional movements ie sit to stands after prolonged positioning. At this time pt BPPV negative with testing however presents with positive head impulse test indicating vestibular hypofunction. Unknown cause of hypofunction bu is potential related to covid 19 and/or vaccines as well as potential neuritis he may be experiencing due to complaints of allergies and improvement in symptoms with his addition of allergy medication. Due to time unable to assess for orthostatic hypotension which is also a differential. Did encourage pt to better track/journal blood glucose when these symptoms occur for potential DM differential for symptoms. At this time he will benefit from PT intervention to dec intensity and frequency of pt symptoms, decrease fall risk, and improve QoL.  Did educate pt on importance of follow up with ENT as well as expectations for improvement with HEP compliance to help confirm PT diagnosis of hypofunction. Pt verbalized agreement.     Barriers to therapy:  Comorbidities    Prognosis: fair    Prognosis details:  Unknown cause of  hypofunction  Goals:     Short term goals:  1. Pt will be compliant with daily vestibular HEP for decreasing symptoms.  2. Pt will complete appropriate balance assessment.     Short term goal time span:  1-2 weeks    Long term goals:  1. Pt will demonstrate negative head impulse test.  2. Pt will report dec in symptom intensity with sit to stand after prolonged sitting 7/10 at worst.  3. Pt will score low fall risk on apropriate assessment.     Long term goal time span:  6-8 weeks  Plan:     Therapy options:  Physical therapy treatment to continue    Planned therapy interventions:  Canalith Repositioning (CPT 07956), Functional Training, Self Care (CPT 27921), Therapeutic Activities (CPT 56296), Therapeutic Exercise (CPT 23026) and Neuromuscular Re-education (CPT 58575)    Frequency:  2x week    Duration in weeks:  6    Discussed with:  Patient     Plan details:  1-2x per week for 6-8 weeks       Functional Assessment Used  Dizziness Handicap Inventory - Physical Items Score: 20  Dizziness Handicap Inventory - Emotional Items Score: 8  Dizziness Handicap Inventory - Functional Items Score: 8  Dizziness Handicap Inventory - Total Score: 36       Referring provider co-signature:  I have reviewed this plan of care and my co-signature certifies the need for services.    Certification Period: 03/28/2022 to  05/23/22    Physician Signature: ________________________________ Date: ______________

## 2022-05-27 ENCOUNTER — OFFICE VISIT (OUTPATIENT)
Dept: URGENT CARE | Facility: PHYSICIAN GROUP | Age: 52
End: 2022-05-27
Payer: COMMERCIAL

## 2022-05-27 VITALS
RESPIRATION RATE: 20 BRPM | BODY MASS INDEX: 34.69 KG/M2 | OXYGEN SATURATION: 98 % | WEIGHT: 221 LBS | TEMPERATURE: 97.9 F | HEART RATE: 86 BPM | DIASTOLIC BLOOD PRESSURE: 90 MMHG | SYSTOLIC BLOOD PRESSURE: 148 MMHG | HEIGHT: 67 IN

## 2022-05-27 DIAGNOSIS — R42 DIZZINESS: ICD-10-CM

## 2022-05-27 DIAGNOSIS — I10 ELEVATED BLOOD PRESSURE READING WITH DIAGNOSIS OF HYPERTENSION: ICD-10-CM

## 2022-05-27 PROCEDURE — 93000 ELECTROCARDIOGRAM COMPLETE: CPT | Performed by: NURSE PRACTITIONER

## 2022-05-27 PROCEDURE — 99214 OFFICE O/P EST MOD 30 MIN: CPT | Performed by: NURSE PRACTITIONER

## 2022-05-27 RX ORDER — AMLODIPINE BESYLATE 5 MG/1
5 TABLET ORAL DAILY
Qty: 30 TABLET | Refills: 0 | Status: SHIPPED | OUTPATIENT
Start: 2022-05-27 | End: 2022-06-13

## 2022-05-27 ASSESSMENT — ENCOUNTER SYMPTOMS
TINGLING: 0
PHOTOPHOBIA: 0
TREMORS: 0
SHORTNESS OF BREATH: 0
FOCAL WEAKNESS: 0
ABDOMINAL PAIN: 0
DIZZINESS: 1
BACK PAIN: 0
SENSORY CHANGE: 0
FEVER: 0
COUGH: 0
SINUS PAIN: 0
NAUSEA: 0
VOMITING: 0
GASTROINTESTINAL NEGATIVE: 1
EYE DISCHARGE: 0
DOUBLE VISION: 0
INSOMNIA: 0
HEADACHES: 1
RESPIRATORY NEGATIVE: 1
EYE REDNESS: 0
NECK PAIN: 0
PALPITATIONS: 0
CARDIOVASCULAR NEGATIVE: 1
NERVOUS/ANXIOUS: 0
WEAKNESS: 0
ORTHOPNEA: 0
MYALGIAS: 0
CHILLS: 0
WHEEZING: 0
DIARRHEA: 0
EYE PAIN: 0
DIAPHORESIS: 0
SPEECH CHANGE: 0
SORE THROAT: 0
CONSTIPATION: 0
HEARTBURN: 0
BLURRED VISION: 1

## 2022-05-27 ASSESSMENT — LIFESTYLE VARIABLES: SUBSTANCE_ABUSE: 0

## 2022-05-27 ASSESSMENT — FIBROSIS 4 INDEX: FIB4 SCORE: 0.92

## 2022-05-27 NOTE — PROGRESS NOTES
"Subjective     Al Corcoran is a 52 y.o. male who presents with Hypertension (Dizziness, ears ringing, HA off and on x 1 week)            HPI  States elevated blood pressures at home on and off this week. Will be within his \"normal range\" of 110s/80s then go up to 150/100. States experiencing tinnitus, on and off headaches, dizziness. No extremity weakness, lower extremity swelling, no cough, chest pain/pressure, shortness of breath. No change in diet, no high salty/fatty foods. No new meds, changes or missed doses of amlodipine. Takes 5mg daily in AM. No other BP med used. No kidney dysfunction. Last blood work resulted in 2/8/2022 with no kidney, liver issues. Newly diagnosed diabetes ,last year. States last time he felt this way he had COVID. Tested negative today for this at home.  No over the counter decongestants, energy drinks or supplements. Recent ECHO and CXR were normal.     PMH:  has a past medical history of Class 1 obesity due to excess calories without serious comorbidity with body mass index (BMI) of 33.0 to 33.9 in adult (6/3/2019), Dyspnea on exertion (6/3/2019), Essential hypertension (6/3/2019), Heart burn (6/3/2019), High cholesterol, High triglycerides, Hypertension, Lightheaded (6/3/2019), and Mixed hyperlipidemia (6/3/2019).  MEDS:   Current Outpatient Medications:   •  Blood Glucose Meter Kit, Test  blood glucose monitoring kit per patient insurance., Disp: 1 Kit, Rfl: 0  •  Blood Glucose Test Strips, Use 2-3 times a day; dispense per glucometer/patient insurance, Disp: 20 Strip, Rfl: 11  •  Lancets, Use 2-3 times a day; dispense per glucometer/patient insurance, Disp: 100 Each, Rfl: 0  •  amLODIPine (NORVASC) 5 MG Tab, Take 1 Tablet by mouth every day., Disp: 90 Tablet, Rfl: 3  •  aspirin (ASA) 81 MG Chew Tab chewable tablet, Chew 81 mg every day., Disp: , Rfl:   •  atorvastatin (LIPITOR) 20 MG Tab, TAKE 1 TABLET BY MOUTH EVERY DAY, Disp: 90 tablet, Rfl: 3  •  omeprazole (PRILOSEC) 20 MG " delayed-release capsule, Take 1 capsule by mouth 2 times a day., Disp: 180 capsule, Rfl: 2  •  metFORMIN ER (GLUCOPHAGE XR) 500 MG TABLET SR 24 HR, Take 2 Tablets by mouth 2 times a day., Disp: 360 tablet, Rfl: 3  •  meclizine (ANTIVERT) 12.5 MG Tab, Take 1 tablet by mouth 3 times a day as needed (dizziness)., Disp: 30 tablet, Rfl: 0  •  Alcohol Swabs, Wipe site with prep pad prior to injection. (Patient not taking: Reported on 5/27/2022), Disp: 100 Each, Rfl: 3  •  acetaminophen (TYLENOL) 500 MG Tab, Take 1,000 mg by mouth every 6 hours as needed. Indications: Pain (Patient not taking: Reported on 5/27/2022), Disp: , Rfl:   ALLERGIES:   Allergies   Allergen Reactions   • Peanuts [Peanut Oil] Anaphylaxis     SURGHX:   Past Surgical History:   Procedure Laterality Date   • VASECTOMY       SOCHX:  reports that he has never smoked. He has never used smokeless tobacco. He reports current alcohol use. He reports that he does not use drugs.  FH: Family history was reviewed, no pertinent findings to report    Review of Systems   Constitutional: Negative for chills, diaphoresis, fever and malaise/fatigue.   HENT: Positive for tinnitus. Negative for congestion, ear discharge, ear pain, hearing loss, sinus pain and sore throat.    Eyes: Positive for blurred vision. Negative for double vision, photophobia, pain, discharge and redness.   Respiratory: Negative.  Negative for cough, shortness of breath and wheezing.    Cardiovascular: Negative.  Negative for chest pain, palpitations, orthopnea and leg swelling.   Gastrointestinal: Negative.  Negative for abdominal pain, constipation, diarrhea, heartburn, nausea and vomiting.   Genitourinary: Negative.    Musculoskeletal: Negative for back pain, myalgias and neck pain.   Skin: Negative for itching and rash.   Neurological: Positive for dizziness and headaches. Negative for tingling, tremors, sensory change, speech change, focal weakness and weakness.   Endo/Heme/Allergies:  "Negative for environmental allergies.   Psychiatric/Behavioral: Negative for substance abuse. The patient is not nervous/anxious and does not have insomnia.    All other systems reviewed and are negative.             Objective     BP (!) 148/90 (BP Location: Right arm, Patient Position: Sitting, BP Cuff Size: Large adult long)   Pulse 86   Temp 36.6 °C (97.9 °F) (Temporal)   Resp 20   Ht 1.702 m (5' 7\")   Wt 100 kg (221 lb)   SpO2 98%   BMI 34.61 kg/m²      Physical Exam  Vitals reviewed.   Constitutional:       General: He is awake. He is not in acute distress.     Appearance: Normal appearance. He is well-developed. He is not ill-appearing, toxic-appearing or diaphoretic.   HENT:      Head: Normocephalic.      Right Ear: Tympanic membrane, ear canal and external ear normal.      Left Ear: Tympanic membrane, ear canal and external ear normal.      Nose: Nose normal.      Mouth/Throat:      Lips: Pink.      Mouth: Mucous membranes are moist.      Pharynx: Oropharynx is clear. Uvula midline.   Eyes:      Extraocular Movements: Extraocular movements intact.      Conjunctiva/sclera: Conjunctivae normal.      Pupils: Pupils are equal, round, and reactive to light.   Cardiovascular:      Rate and Rhythm: Normal rate and regular rhythm.      Pulses: Normal pulses.      Heart sounds: Normal heart sounds, S1 normal and S2 normal.   Pulmonary:      Effort: Pulmonary effort is normal. No tachypnea, accessory muscle usage or respiratory distress.      Breath sounds: Normal breath sounds and air entry. No stridor, decreased air movement or transmitted upper airway sounds. No decreased breath sounds, wheezing, rhonchi or rales.   Musculoskeletal:         General: Normal range of motion.      Cervical back: Normal range of motion and neck supple.      Right lower leg: No edema.      Left lower leg: No edema.   Skin:     General: Skin is warm and dry.   Neurological:      Mental Status: He is alert and oriented to person, " place, and time.      GCS: GCS eye subscore is 4. GCS verbal subscore is 5. GCS motor subscore is 6.      Cranial Nerves: Cranial nerves are intact.      Sensory: Sensation is intact.      Motor: Motor function is intact.      Coordination: Coordination is intact.      Gait: Gait is intact.   Psychiatric:         Attention and Perception: Attention and perception normal.         Mood and Affect: Mood and affect normal.         Speech: Speech normal.         Behavior: Behavior is uncooperative.         Thought Content: Thought content normal.         Cognition and Memory: Cognition and memory normal.         Judgment: Judgment normal.                             Assessment & Plan        1. Elevated blood pressure reading with diagnosis of hypertension    - EKG - Clinic Performed  - amLODIPine (NORVASC) 5 MG Tab; Take 1 Tablet by mouth every day.  Dispense: 30 Tablet; Refill: 0    2. Dizziness    - EKG - Clinic Performed   No acute distress, mild symptoms at this time with slight elevation or BP  -Increase dose of amlodipine from 5 mg to 10 mg once daily   -Monitor for consistent blood pressures > 150/90, headaches, dizziness, blurry vision, extremity weakness, chest pain/pressure, shortness of breath, nausea/vomiting- must be seen back in UC or got to ER- call 911  -Follow up with primary care regarding change in amlodipine dose and if necessary to change to other medication     -Education provided: see above    -Return to urgent care as needed if new or worsening symptoms  -Patient expresses understanding to treatment and plan of care  -Questions were encouraged and answered  -Recommended over the counter meds were written down when requested   -Advised to follow-up with the primary care provider for recheck, reevaluation, and consideration of further management as needed     -Time spent evaluating this patient was at least 30 minutes. This time comprises of the following: preparing for visit/chart review, patient  history taken into account pertinent to symptoms, patient counseling/education for needed treatment outpatient, exam/evaluation/treatment plan provided, ordering any appropriate lab/test/procedures/meds, independent interpretation of any clinic testing, medication management with any other listed medications and chart documentation.

## 2022-06-08 LAB
BASOPHILS # BLD AUTO: 0.1 X10E3/UL (ref 0–0.2)
BASOPHILS NFR BLD AUTO: 1 %
EOSINOPHIL # BLD AUTO: 0.3 X10E3/UL (ref 0–0.4)
EOSINOPHIL NFR BLD AUTO: 4 %
ERYTHROCYTE [DISTWIDTH] IN BLOOD BY AUTOMATED COUNT: 13.8 % (ref 11.6–15.4)
GGT SERPL-CCNC: 52 IU/L (ref 0–65)
HCT VFR BLD AUTO: 47.1 % (ref 37.5–51)
HGB BLD-MCNC: 15.7 G/DL (ref 13–17.7)
IMM GRANULOCYTES # BLD AUTO: 0 X10E3/UL (ref 0–0.1)
IMM GRANULOCYTES NFR BLD AUTO: 0 %
IMMATURE CELLS  115398: NORMAL
LYMPHOCYTES # BLD AUTO: 2.7 X10E3/UL (ref 0.7–3.1)
LYMPHOCYTES NFR BLD AUTO: 34 %
MCH RBC QN AUTO: 29 PG (ref 26.6–33)
MCHC RBC AUTO-ENTMCNC: 33.3 G/DL (ref 31.5–35.7)
MCV RBC AUTO: 87 FL (ref 79–97)
MONOCYTES # BLD AUTO: 0.7 X10E3/UL (ref 0.1–0.9)
MONOCYTES NFR BLD AUTO: 9 %
MORPHOLOGY BLD-IMP: NORMAL
NEUTROPHILS # BLD AUTO: 4.1 X10E3/UL (ref 1.4–7)
NEUTROPHILS NFR BLD AUTO: 52 %
NRBC BLD AUTO-RTO: NORMAL %
PLATELET # BLD AUTO: 284 X10E3/UL (ref 150–450)
PSA SERPL-MCNC: 1.6 NG/ML (ref 0–4)
RBC # BLD AUTO: 5.41 X10E6/UL (ref 4.14–5.8)
TSH SERPL DL<=0.005 MIU/L-ACNC: 1.94 UIU/ML (ref 0.45–4.5)
WBC # BLD AUTO: 7.8 X10E3/UL (ref 3.4–10.8)

## 2022-06-13 DIAGNOSIS — I10 ELEVATED BLOOD PRESSURE READING WITH DIAGNOSIS OF HYPERTENSION: ICD-10-CM

## 2022-06-13 DIAGNOSIS — E78.5 DYSLIPIDEMIA: ICD-10-CM

## 2022-06-13 DIAGNOSIS — E11.9 NON-INSULIN TREATED TYPE 2 DIABETES MELLITUS (HCC): ICD-10-CM

## 2022-06-13 RX ORDER — EMPAGLIFLOZIN 25 MG/1
1 TABLET, FILM COATED ORAL
Qty: 90 TABLET | Refills: 3 | Status: SHIPPED | OUTPATIENT
Start: 2022-06-13 | End: 2022-12-13

## 2022-06-13 RX ORDER — LOSARTAN POTASSIUM 100 MG/1
100 TABLET ORAL DAILY
Qty: 90 TABLET | Refills: 4 | Status: SHIPPED | OUTPATIENT
Start: 2022-06-13 | End: 2022-12-13 | Stop reason: SDUPTHER

## 2022-06-13 RX ORDER — SPIRONOLACTONE 50 MG/1
50 TABLET, FILM COATED ORAL DAILY
Qty: 90 TABLET | Refills: 4 | Status: SHIPPED | OUTPATIENT
Start: 2022-06-13 | End: 2022-12-13 | Stop reason: SDUPTHER

## 2022-06-13 RX ORDER — OMEPRAZOLE 20 MG/1
20 CAPSULE, DELAYED RELEASE ORAL 2 TIMES DAILY
Qty: 180 CAPSULE | Refills: 4 | Status: SHIPPED | OUTPATIENT
Start: 2022-06-13 | End: 2022-12-13 | Stop reason: SDUPTHER

## 2022-06-13 RX ORDER — ATORVASTATIN CALCIUM 20 MG/1
20 TABLET, FILM COATED ORAL
Qty: 90 TABLET | Refills: 4 | Status: SHIPPED | OUTPATIENT
Start: 2022-06-13 | End: 2022-12-13 | Stop reason: SDUPTHER

## 2022-07-07 DIAGNOSIS — E11.65 UNCONTROLLED TYPE 2 DIABETES MELLITUS WITH HYPERGLYCEMIA (HCC): ICD-10-CM

## 2022-07-07 RX ORDER — LANCETS 33 GAUGE
EACH MISCELLANEOUS
OUTPATIENT
Start: 2022-07-07

## 2022-08-03 ENCOUNTER — TELEPHONE (OUTPATIENT)
Dept: PHYSICAL THERAPY | Facility: REHABILITATION | Age: 52
End: 2022-08-03
Payer: COMMERCIAL

## 2022-08-03 NOTE — OP THERAPY DISCHARGE SUMMARY
Outpatient Physical Therapy  DISCHARGE SUMMARY NOTE      [facility] [address]  [address]  [place] [address]  Phone:  [phone]  Fax:  [phone]    Date of Visit: 08/03/2022    Patient: Al Corcoran  YOB: 1970  MRN: [id number]        Referring Provider: [place] [address] [address]   Referring Diagnosis: Benign paroxysmal positional vertigo, unspecified laterality [H81.10]           Functional Assessment Used        Your patient is being discharged from Physical Therapy with the following comments:   · Patient has failed to schedule or reschedule follow-up visits    Comments:  Pt has not returned since evaluation 3/28/22. Appropriate to DC case.  Mireille Miramontes, PT, DPT    Date: 8/3/2022

## 2022-09-08 NOTE — PROGRESS NOTES
Pre-employment physical exam. See scanned documents     Purse String (Simple) Text: Given the location of the defect and the characteristics of the surrounding skin a purse string closure was deemed most appropriate.  Undermining was performed circumfirentially around the surgical defect.  A purse string suture was then placed and tightened.

## 2022-12-13 ENCOUNTER — OFFICE VISIT (OUTPATIENT)
Dept: MEDICAL GROUP | Facility: MEDICAL CENTER | Age: 52
End: 2022-12-13
Payer: COMMERCIAL

## 2022-12-13 VITALS
SYSTOLIC BLOOD PRESSURE: 142 MMHG | DIASTOLIC BLOOD PRESSURE: 84 MMHG | HEART RATE: 94 BPM | WEIGHT: 229.6 LBS | HEIGHT: 67 IN | OXYGEN SATURATION: 97 % | TEMPERATURE: 96.2 F | BODY MASS INDEX: 36.03 KG/M2

## 2022-12-13 DIAGNOSIS — R42 VERTIGO: ICD-10-CM

## 2022-12-13 DIAGNOSIS — K21.9 GASTROESOPHAGEAL REFLUX DISEASE WITHOUT ESOPHAGITIS: ICD-10-CM

## 2022-12-13 DIAGNOSIS — E66.9 OBESITY (BMI 30-39.9): ICD-10-CM

## 2022-12-13 DIAGNOSIS — Z12.12 SCREENING FOR COLORECTAL CANCER: ICD-10-CM

## 2022-12-13 DIAGNOSIS — R13.14 PHARYNGOESOPHAGEAL DYSPHAGIA: ICD-10-CM

## 2022-12-13 DIAGNOSIS — Z12.11 SCREENING FOR COLORECTAL CANCER: ICD-10-CM

## 2022-12-13 DIAGNOSIS — I10 ESSENTIAL HYPERTENSION: ICD-10-CM

## 2022-12-13 DIAGNOSIS — Z23 NEED FOR VACCINATION: ICD-10-CM

## 2022-12-13 DIAGNOSIS — E11.9 TYPE 2 DIABETES MELLITUS WITHOUT COMPLICATION, WITHOUT LONG-TERM CURRENT USE OF INSULIN (HCC): ICD-10-CM

## 2022-12-13 PROBLEM — R12 HEARTBURN: Status: RESOLVED | Noted: 2019-06-03 | Resolved: 2022-12-13

## 2022-12-13 PROBLEM — R74.01 ALT (SGPT) LEVEL RAISED: Status: RESOLVED | Noted: 2021-04-13 | Resolved: 2022-12-13

## 2022-12-13 PROBLEM — E66.09 CLASS 1 OBESITY DUE TO EXCESS CALORIES WITHOUT SERIOUS COMORBIDITY WITH BODY MASS INDEX (BMI) OF 33.0 TO 33.9 IN ADULT: Status: RESOLVED | Noted: 2019-06-03 | Resolved: 2022-12-13

## 2022-12-13 PROBLEM — R73.9 HYPERGLYCEMIA: Status: RESOLVED | Noted: 2021-04-13 | Resolved: 2022-12-13

## 2022-12-13 LAB
HBA1C MFR BLD: 7 % (ref 0–5.6)
INT CON NEG: NEGATIVE
INT CON POS: POSITIVE

## 2022-12-13 PROCEDURE — 83036 HEMOGLOBIN GLYCOSYLATED A1C: CPT | Performed by: STUDENT IN AN ORGANIZED HEALTH CARE EDUCATION/TRAINING PROGRAM

## 2022-12-13 PROCEDURE — 90686 IIV4 VACC NO PRSV 0.5 ML IM: CPT | Performed by: STUDENT IN AN ORGANIZED HEALTH CARE EDUCATION/TRAINING PROGRAM

## 2022-12-13 PROCEDURE — 90471 IMMUNIZATION ADMIN: CPT | Performed by: STUDENT IN AN ORGANIZED HEALTH CARE EDUCATION/TRAINING PROGRAM

## 2022-12-13 PROCEDURE — 99214 OFFICE O/P EST MOD 30 MIN: CPT | Mod: 25 | Performed by: STUDENT IN AN ORGANIZED HEALTH CARE EDUCATION/TRAINING PROGRAM

## 2022-12-13 RX ORDER — ATORVASTATIN CALCIUM 20 MG/1
20 TABLET, FILM COATED ORAL
Qty: 90 TABLET | Refills: 4 | Status: SHIPPED | OUTPATIENT
Start: 2022-12-13 | End: 2023-12-18

## 2022-12-13 RX ORDER — CETIRIZINE HYDROCHLORIDE 10 MG/1
TABLET ORAL
COMMUNITY

## 2022-12-13 RX ORDER — METFORMIN HYDROCHLORIDE 500 MG/1
1000 TABLET, EXTENDED RELEASE ORAL 2 TIMES DAILY
Qty: 360 TABLET | Refills: 3 | Status: SHIPPED | OUTPATIENT
Start: 2022-12-13

## 2022-12-13 RX ORDER — LOSARTAN POTASSIUM 100 MG/1
100 TABLET ORAL DAILY
Qty: 90 TABLET | Refills: 4 | Status: SHIPPED | OUTPATIENT
Start: 2022-12-13 | End: 2023-12-18

## 2022-12-13 RX ORDER — OMEPRAZOLE 20 MG/1
20 CAPSULE, DELAYED RELEASE ORAL 2 TIMES DAILY
Qty: 180 CAPSULE | Refills: 4 | Status: SHIPPED | OUTPATIENT
Start: 2022-12-13 | End: 2023-12-27

## 2022-12-13 RX ORDER — SPIRONOLACTONE 50 MG/1
50 TABLET, FILM COATED ORAL DAILY
Qty: 90 TABLET | Refills: 4 | Status: SHIPPED | OUTPATIENT
Start: 2022-12-13

## 2022-12-13 RX ORDER — AMLODIPINE BESYLATE 5 MG/1
5 TABLET ORAL DAILY
Qty: 90 TABLET | Refills: 1 | Status: SHIPPED | OUTPATIENT
Start: 2022-12-13 | End: 2023-05-16

## 2022-12-13 SDOH — ECONOMIC STABILITY: TRANSPORTATION INSECURITY
IN THE PAST 12 MONTHS, HAS THE LACK OF TRANSPORTATION KEPT YOU FROM MEDICAL APPOINTMENTS OR FROM GETTING MEDICATIONS?: NO

## 2022-12-13 SDOH — ECONOMIC STABILITY: INCOME INSECURITY: IN THE LAST 12 MONTHS, WAS THERE A TIME WHEN YOU WERE NOT ABLE TO PAY THE MORTGAGE OR RENT ON TIME?: NO

## 2022-12-13 SDOH — ECONOMIC STABILITY: FOOD INSECURITY: WITHIN THE PAST 12 MONTHS, THE FOOD YOU BOUGHT JUST DIDN'T LAST AND YOU DIDN'T HAVE MONEY TO GET MORE.: NEVER TRUE

## 2022-12-13 SDOH — ECONOMIC STABILITY: HOUSING INSECURITY
IN THE LAST 12 MONTHS, WAS THERE A TIME WHEN YOU DID NOT HAVE A STEADY PLACE TO SLEEP OR SLEPT IN A SHELTER (INCLUDING NOW)?: NO

## 2022-12-13 SDOH — HEALTH STABILITY: MENTAL HEALTH
STRESS IS WHEN SOMEONE FEELS TENSE, NERVOUS, ANXIOUS, OR CAN'T SLEEP AT NIGHT BECAUSE THEIR MIND IS TROUBLED. HOW STRESSED ARE YOU?: ONLY A LITTLE

## 2022-12-13 SDOH — ECONOMIC STABILITY: TRANSPORTATION INSECURITY
IN THE PAST 12 MONTHS, HAS LACK OF RELIABLE TRANSPORTATION KEPT YOU FROM MEDICAL APPOINTMENTS, MEETINGS, WORK OR FROM GETTING THINGS NEEDED FOR DAILY LIVING?: NO

## 2022-12-13 SDOH — HEALTH STABILITY: PHYSICAL HEALTH: ON AVERAGE, HOW MANY MINUTES DO YOU ENGAGE IN EXERCISE AT THIS LEVEL?: 0 MIN

## 2022-12-13 SDOH — HEALTH STABILITY: PHYSICAL HEALTH: ON AVERAGE, HOW MANY DAYS PER WEEK DO YOU ENGAGE IN MODERATE TO STRENUOUS EXERCISE (LIKE A BRISK WALK)?: 0 DAYS

## 2022-12-13 SDOH — ECONOMIC STABILITY: FOOD INSECURITY: WITHIN THE PAST 12 MONTHS, YOU WORRIED THAT YOUR FOOD WOULD RUN OUT BEFORE YOU GOT MONEY TO BUY MORE.: NEVER TRUE

## 2022-12-13 SDOH — ECONOMIC STABILITY: TRANSPORTATION INSECURITY
IN THE PAST 12 MONTHS, HAS LACK OF TRANSPORTATION KEPT YOU FROM MEETINGS, WORK, OR FROM GETTING THINGS NEEDED FOR DAILY LIVING?: NO

## 2022-12-13 SDOH — ECONOMIC STABILITY: INCOME INSECURITY: HOW HARD IS IT FOR YOU TO PAY FOR THE VERY BASICS LIKE FOOD, HOUSING, MEDICAL CARE, AND HEATING?: NOT HARD AT ALL

## 2022-12-13 SDOH — ECONOMIC STABILITY: HOUSING INSECURITY: IN THE LAST 12 MONTHS, HOW MANY PLACES HAVE YOU LIVED?: 1

## 2022-12-13 ASSESSMENT — ENCOUNTER SYMPTOMS
BLOOD IN STOOL: 0
VOMITING: 0
DIARRHEA: 0
CONSTIPATION: 0
COUGH: 0
ABDOMINAL PAIN: 0
FEVER: 0
WEIGHT LOSS: 0
CHILLS: 0
WHEEZING: 0
MYALGIAS: 0
PALPITATIONS: 0
NAUSEA: 0

## 2022-12-13 ASSESSMENT — SOCIAL DETERMINANTS OF HEALTH (SDOH)
IN A TYPICAL WEEK, HOW MANY TIMES DO YOU TALK ON THE PHONE WITH FAMILY, FRIENDS, OR NEIGHBORS?: MORE THAN THREE TIMES A WEEK
HOW OFTEN DO YOU GET TOGETHER WITH FRIENDS OR RELATIVES?: ONCE A WEEK
IN A TYPICAL WEEK, HOW MANY TIMES DO YOU TALK ON THE PHONE WITH FAMILY, FRIENDS, OR NEIGHBORS?: MORE THAN THREE TIMES A WEEK
HOW HARD IS IT FOR YOU TO PAY FOR THE VERY BASICS LIKE FOOD, HOUSING, MEDICAL CARE, AND HEATING?: NOT HARD AT ALL
HOW OFTEN DO YOU HAVE SIX OR MORE DRINKS ON ONE OCCASION: NEVER
HOW OFTEN DO YOU ATTEND CHURCH OR RELIGIOUS SERVICES?: NEVER
HOW MANY DRINKS CONTAINING ALCOHOL DO YOU HAVE ON A TYPICAL DAY WHEN YOU ARE DRINKING: 1 OR 2
HOW OFTEN DO YOU ATTENT MEETINGS OF THE CLUB OR ORGANIZATION YOU BELONG TO?: NEVER
HOW OFTEN DO YOU ATTENT MEETINGS OF THE CLUB OR ORGANIZATION YOU BELONG TO?: NEVER
DO YOU BELONG TO ANY CLUBS OR ORGANIZATIONS SUCH AS CHURCH GROUPS UNIONS, FRATERNAL OR ATHLETIC GROUPS, OR SCHOOL GROUPS?: NO
WITHIN THE PAST 12 MONTHS, YOU WORRIED THAT YOUR FOOD WOULD RUN OUT BEFORE YOU GOT THE MONEY TO BUY MORE: NEVER TRUE
HOW OFTEN DO YOU ATTEND CHURCH OR RELIGIOUS SERVICES?: NEVER
HOW OFTEN DO YOU GET TOGETHER WITH FRIENDS OR RELATIVES?: ONCE A WEEK
DO YOU BELONG TO ANY CLUBS OR ORGANIZATIONS SUCH AS CHURCH GROUPS UNIONS, FRATERNAL OR ATHLETIC GROUPS, OR SCHOOL GROUPS?: NO
HOW OFTEN DO YOU HAVE A DRINK CONTAINING ALCOHOL: 2-4 TIMES A MONTH

## 2022-12-13 ASSESSMENT — LIFESTYLE VARIABLES
HOW OFTEN DO YOU HAVE A DRINK CONTAINING ALCOHOL: 2-4 TIMES A MONTH
SKIP TO QUESTIONS 9-10: 1
HOW OFTEN DO YOU HAVE SIX OR MORE DRINKS ON ONE OCCASION: NEVER
AUDIT-C TOTAL SCORE: 2
HOW MANY STANDARD DRINKS CONTAINING ALCOHOL DO YOU HAVE ON A TYPICAL DAY: 1 OR 2

## 2022-12-13 ASSESSMENT — FIBROSIS 4 INDEX: FIB4 SCORE: 0.88

## 2022-12-13 NOTE — PROGRESS NOTES
Subjective:     CC: T2DM, HTN, re-establish with new PCP    HPI:   Al presents today with    Problem   Type 2 Diabetes Mellitus, Without Long-Term Current Use of Insulin (Hcc)    This is a chronic condition diagnosed 1 year.   Current medications:  Insulin: NA  Biguanide: metformin 1000mg BID ( currently taking daily)    GLP1-RA:  NA  SGLT-2i:    empagliflozin 10mg daily ( started 12/13/22)  DPP4-I: NA  TZD: NA  Annie: NA  Sulfonyluria: NA    Last A1c: 7.0 -> 8.3+  Last Microalb/Cr ratio: >120+  Last diabetic foot exam: 12/13/2022  Last retinal eye exam:   ACEi/ARB? Losartan 100mg  Statin? Atorvastatin 20mg  Aspirin? Primary prevention per last PCP     Gerd (Gastroesophageal Reflux Disease)    - Hx of oropharygeal dysphagia and  EGD 12/2019 with distal mild esophagitis, biopsy taken did not show Eosinophil esophagitis  - Omeprazole 20mg BID and report symptoms controlled.      Pharyngoesophageal Dysphagia    - Evaluated by speech therapy thought to be more esophageal than oropharyngeal 12/2019. EGD done 12/2019 with mild distal esophagitis, biopsied specimen were normal.      Vertigo    Report long history of episodic vertigo which is well controlled on cetirizine 10mg per patient     Essential Hypertension    This is a chronic condition.  Onset:   Current Meds:  - spironolactone 50mg  - losartan 100mg  Side effects?   - denies  Home BP Log:  - SBP 140s  Associated symptoms: decrease exercise tolerance 2/2 deconditioning     12/13: amlodipine 5mg started       Alt (Sgpt) Level Raised (Resolved)   Hyperglycemia (Resolved)   Heart burn (Resolved)    IMO load March 2020     Class 1 Obesity Due to Excess Calories Without Serious Comorbidity With Body Mass Index (Bmi) of 33.0 to 33.9 in Adult (Resolved)       Health Maintenance: discussed  ROS:  Review of Systems   Constitutional:  Negative for chills, fever and weight loss.   Respiratory:  Negative for cough and wheezing.    Cardiovascular:  Negative for chest pain  "and palpitations.   Gastrointestinal:  Negative for abdominal pain, blood in stool, constipation, diarrhea, nausea and vomiting.   Genitourinary:  Negative for dysuria, frequency and urgency.   Musculoskeletal:  Negative for joint pain and myalgias.     Objective:     Exam:  BP (!) 142/84   Pulse 94   Temp (!) 35.7 °C (96.2 °F) (Temporal)   Ht 1.702 m (5' 7\")   Wt 104 kg (229 lb 9.6 oz)   SpO2 97%   BMI 35.96 kg/m²  Body mass index is 35.96 kg/m².    Physical Exam  Constitutional:       Appearance: Normal appearance.   Cardiovascular:      Rate and Rhythm: Normal rate and regular rhythm.   Pulmonary:      Effort: Pulmonary effort is normal.      Breath sounds: Normal breath sounds.   Skin:     Comments: Diabetic foot exam: No lesions or calluses noted. 2+ pedal pulses. Sensation intact with 10 out of 10 on monofilament test.     Neurological:      Mental Status: He is alert.         Labs: A1C 8.3, , last cbc was fine, last Cmp egfr  77    Assessment & Plan:     52 y.o. male with the following -     1. Essential hypertension  Chronic, poorly controlled  Tolerating current medication losartan 100mg and spironolactone 50mg without adverse effect  Report home BP elevated in 140s  Plan  - amLODIPine (NORVASC) 5 MG Tab; Take 1 Tablet by mouth every day.  Dispense: 90 Tablet; Refill: 1  - CBC WITH DIFFERENTIAL; Future  - TSH WITH REFLEX TO FT4; Future  - atorvastatin (LIPITOR) 20 MG Tab; Take 1 Tablet by mouth every day.  Dispense: 90 Tablet; Refill: 4  - losartan (COZAAR) 100 MG Tab; Take 1 Tablet by mouth every day.  Dispense: 90 Tablet; Refill: 4  - spironolactone (ALDACTONE) 50 MG Tab; Take 1 Tablet by mouth every day.  Dispense: 90 Tablet; Refill: 4    2. Type 2 diabetes mellitus without complication, without long-term current use of insulin (formerly [facility])  - Chronic, controlled  - POCT A1c 7.0 from 8.3 last visit  Plan  - repeat microalbumin/cr level, last done 10 month ago with significant proteinuria  - " continue current medication  - will add jiardiance 10mg for further BG control and renal protective effect  - will get retinal exam next visit    - POCT  A1C  - POCT Retinal Eye Exam  - Comp Metabolic Panel; Future  - Hemoglobin A1c; Future  - Lipid Profile; Future  - Microalbumin Creat Ratio Urine - Lab Collect; Future  - Diabetic Monofilament Lower Extremity Exam  - Patient identified as having weight management issue.  Appropriate orders and counseling given.  - atorvastatin (LIPITOR) 20 MG Tab; Take 1 Tablet by mouth every day.  Dispense: 90 Tablet; Refill: 4  - losartan (COZAAR) 100 MG Tab; Take 1 Tablet by mouth every day.  Dispense: 90 Tablet; Refill: 4  - metFORMIN ER (GLUCOPHAGE XR) 500 MG TABLET SR 24 HR; Take 2 Tablets by mouth 2 times a day.  Dispense: 360 Tablet; Refill: 3    3. Screening for colorectal cancer    - COLOGUARD (FIT DNA)    4. Need for vaccination    - INFLUENZA VACCINE QUAD INJ (PF)    5. Gastroesophageal reflux disease without esophagitis  Chronic stable on omeprazole 20mg BID. Med refilled  - omeprazole (PRILOSEC) 20 MG delayed-release capsule; Take 1 Capsule by mouth 2 times a day.  Dispense: 180 Capsule; Refill: 4    6 vertigo  Chronic stable on cetirizine 10mg without adverse effect. Continue plan of treatment          Return in about 2 months (around 2/13/2023) for Diabetes, Hypertension + BP LOG.    Please note that this dictation was created using voice recognition software. I have made every reasonable attempt to correct obvious errors, but I expect that there are errors of grammar and possibly content that I did not discover before finalizing the note.

## 2022-12-14 LAB
ALBUMIN SERPL-MCNC: 4.8 G/DL (ref 3.8–4.9)
ALBUMIN/GLOB SERPL: 1.8 {RATIO} (ref 1.2–2.2)
ALP SERPL-CCNC: 67 IU/L (ref 44–121)
ALT SERPL-CCNC: 52 IU/L (ref 0–44)
AST SERPL-CCNC: 29 IU/L (ref 0–40)
BASOPHILS # BLD AUTO: 0.1 X10E3/UL (ref 0–0.2)
BASOPHILS NFR BLD AUTO: 1 %
BILIRUB SERPL-MCNC: 0.3 MG/DL (ref 0–1.2)
BUN SERPL-MCNC: 22 MG/DL (ref 6–24)
BUN/CREAT SERPL: 19 (ref 9–20)
CALCIUM SERPL-MCNC: 9.7 MG/DL (ref 8.7–10.2)
CHLORIDE SERPL-SCNC: 99 MMOL/L (ref 96–106)
CHOLEST SERPL-MCNC: 192 MG/DL (ref 100–199)
CO2 SERPL-SCNC: 23 MMOL/L (ref 20–29)
CREAT SERPL-MCNC: 1.14 MG/DL (ref 0.76–1.27)
EGFRCR SERPLBLD CKD-EPI 2021: 76 ML/MIN/1.73
EOSINOPHIL # BLD AUTO: 0.2 X10E3/UL (ref 0–0.4)
EOSINOPHIL NFR BLD AUTO: 3 %
ERYTHROCYTE [DISTWIDTH] IN BLOOD BY AUTOMATED COUNT: 13.5 % (ref 11.6–15.4)
GLOBULIN SER CALC-MCNC: 2.7 G/DL (ref 1.5–4.5)
GLUCOSE SERPL-MCNC: 153 MG/DL (ref 70–99)
HCT VFR BLD AUTO: 43.9 % (ref 37.5–51)
HDLC SERPL-MCNC: 43 MG/DL
HGB BLD-MCNC: 14.7 G/DL (ref 13–17.7)
IMM GRANULOCYTES # BLD AUTO: 0 X10E3/UL (ref 0–0.1)
IMM GRANULOCYTES NFR BLD AUTO: 0 %
IMMATURE CELLS  115398: NORMAL
LABORATORY COMMENT REPORT: ABNORMAL
LDLC SERPL CALC-MCNC: 103 MG/DL (ref 0–99)
LYMPHOCYTES # BLD AUTO: 2.4 X10E3/UL (ref 0.7–3.1)
LYMPHOCYTES NFR BLD AUTO: 31 %
MCH RBC QN AUTO: 29.9 PG (ref 26.6–33)
MCHC RBC AUTO-ENTMCNC: 33.5 G/DL (ref 31.5–35.7)
MCV RBC AUTO: 89 FL (ref 79–97)
MONOCYTES # BLD AUTO: 0.7 X10E3/UL (ref 0.1–0.9)
MONOCYTES NFR BLD AUTO: 9 %
MORPHOLOGY BLD-IMP: NORMAL
NEUTROPHILS # BLD AUTO: 4.5 X10E3/UL (ref 1.4–7)
NEUTROPHILS NFR BLD AUTO: 56 %
NRBC BLD AUTO-RTO: NORMAL %
PLATELET # BLD AUTO: 223 X10E3/UL (ref 150–450)
POTASSIUM SERPL-SCNC: 5.1 MMOL/L (ref 3.5–5.2)
PROT SERPL-MCNC: 7.5 G/DL (ref 6–8.5)
RBC # BLD AUTO: 4.92 X10E6/UL (ref 4.14–5.8)
SODIUM SERPL-SCNC: 135 MMOL/L (ref 134–144)
TRIGL SERPL-MCNC: 267 MG/DL (ref 0–149)
TSH SERPL DL<=0.005 MIU/L-ACNC: 2.12 UIU/ML (ref 0.45–4.5)
VLDLC SERPL CALC-MCNC: 46 MG/DL (ref 5–40)
WBC # BLD AUTO: 7.9 X10E3/UL (ref 3.4–10.8)

## 2023-02-15 ENCOUNTER — APPOINTMENT (OUTPATIENT)
Dept: MEDICAL GROUP | Facility: MEDICAL CENTER | Age: 53
End: 2023-02-15
Payer: COMMERCIAL

## 2023-05-16 ENCOUNTER — OFFICE VISIT (OUTPATIENT)
Dept: URGENT CARE | Facility: PHYSICIAN GROUP | Age: 53
End: 2023-05-16
Payer: COMMERCIAL

## 2023-05-16 VITALS
DIASTOLIC BLOOD PRESSURE: 68 MMHG | HEART RATE: 79 BPM | BODY MASS INDEX: 35.79 KG/M2 | RESPIRATION RATE: 16 BRPM | HEIGHT: 67 IN | WEIGHT: 228 LBS | TEMPERATURE: 96.9 F | SYSTOLIC BLOOD PRESSURE: 118 MMHG | OXYGEN SATURATION: 99 %

## 2023-05-16 DIAGNOSIS — R59.0 LYMPHADENOPATHY, SUBMANDIBULAR: ICD-10-CM

## 2023-05-16 DIAGNOSIS — Z01.89 PATIENT REQUESTED DIAGNOSTIC TESTING: ICD-10-CM

## 2023-05-16 DIAGNOSIS — R53.83 OTHER FATIGUE: ICD-10-CM

## 2023-05-16 PROCEDURE — 3078F DIAST BP <80 MM HG: CPT | Performed by: STUDENT IN AN ORGANIZED HEALTH CARE EDUCATION/TRAINING PROGRAM

## 2023-05-16 PROCEDURE — 3074F SYST BP LT 130 MM HG: CPT | Performed by: STUDENT IN AN ORGANIZED HEALTH CARE EDUCATION/TRAINING PROGRAM

## 2023-05-16 PROCEDURE — 99213 OFFICE O/P EST LOW 20 MIN: CPT | Performed by: STUDENT IN AN ORGANIZED HEALTH CARE EDUCATION/TRAINING PROGRAM

## 2023-05-16 ASSESSMENT — ENCOUNTER SYMPTOMS
CONSTIPATION: 0
ABDOMINAL PAIN: 0
FEVER: 0
COUGH: 0
PALPITATIONS: 0
VOMITING: 0
WHEEZING: 0
CHILLS: 0
SHORTNESS OF BREATH: 0
DIARRHEA: 0
SORE THROAT: 0
NAUSEA: 1

## 2023-05-16 ASSESSMENT — FIBROSIS 4 INDEX: FIB4 SCORE: 0.97

## 2023-05-16 NOTE — PROGRESS NOTES
"Subjective     Al Corcoran is a 52 y.o. male who presents with Facial Swelling (L side chin and neck swelling/Finished 21 day antibiotic round for ear infection), Nausea (Worsened in the past day), and Fatigue (Worsened in the past day/)            Al is a 52 y.o. male who presents to urgent care with left chin swelling.  Patient states he believes it is a swollen olecranon.  Patient states he recently completed 21-day course of antibiotics for ear infection.  Patient states he was on 21-day course of amoxicillin twice daily.  Patient states on Saturday he developed symptoms of fever/chills which have since resolved.  He also had some nausea yesterday and worsening fatigue.  Patient states that his wife is concerned for sepsis.        Review of Systems   Constitutional:  Positive for malaise/fatigue. Negative for chills and fever.   HENT:  Negative for congestion, ear pain and sore throat.    Respiratory:  Negative for cough, shortness of breath and wheezing.    Cardiovascular:  Negative for chest pain and palpitations.   Gastrointestinal:  Positive for nausea. Negative for abdominal pain, constipation, diarrhea and vomiting.   All other systems reviewed and are negative.             Objective     /68 (BP Location: Right arm, Patient Position: Sitting, BP Cuff Size: Adult long)   Pulse 79   Temp 36.1 °C (96.9 °F) (Temporal)   Resp 16   Ht 1.702 m (5' 7\")   Wt 103 kg (228 lb)   SpO2 99%   BMI 35.71 kg/m²      Physical Exam  Vitals reviewed.   Constitutional:       Appearance: Normal appearance.   HENT:      Head: Normocephalic and atraumatic.      Right Ear: Tympanic membrane, ear canal and external ear normal.      Left Ear: Tympanic membrane, ear canal and external ear normal.      Nose: Nose normal.      Mouth/Throat:      Lips: Pink.      Mouth: Mucous membranes are moist.      Pharynx: Oropharynx is clear. Uvula midline.   Eyes:      Extraocular Movements: Extraocular movements intact.      " Conjunctiva/sclera: Conjunctivae normal.      Pupils: Pupils are equal, round, and reactive to light.   Neck:      Comments: Left submandibular lymphadenopathy.  Cardiovascular:      Rate and Rhythm: Normal rate and regular rhythm.   Pulmonary:      Effort: Pulmonary effort is normal.      Breath sounds: Normal breath sounds.   Musculoskeletal:      Cervical back: No rigidity.   Lymphadenopathy:      Cervical: No cervical adenopathy.   Skin:     General: Skin is warm and dry.   Neurological:      General: No focal deficit present.      Mental Status: He is alert. Mental status is at baseline.                             Assessment & Plan        1. Lymphadenopathy, submandibular    2. Other fatigue    3. Patient requested diagnostic testing  - CBC WITH DIFFERENTIAL; Future     Patient is well-appearing in clinic.  Vital signs are stable.  Physical exam did reveal left submandibular lymphadenopathy.  Remainder physical exam within normal limits.  Patient requesting lab orders for CBC with differential.  CBC with differential ordered and patient advised that he will need to follow-up with PCP regarding results.    Differential diagnoses, supportive care, and indications for immediate follow-up discussed with patient. Pathogenesis of diagnosis discussed including typical length and natural progression.      Instructed to return to urgent care or nearest emergency department if symptoms fail to improve, for any change in condition, further concerns, or new concerning symptoms.    Patient states understanding and agrees with the plan of care and discharge instructions.

## 2023-05-17 LAB
BASOPHILS # BLD AUTO: 0.1 X10E3/UL (ref 0–0.2)
BASOPHILS NFR BLD AUTO: 1 %
EOSINOPHIL # BLD AUTO: 0.3 X10E3/UL (ref 0–0.4)
EOSINOPHIL NFR BLD AUTO: 4 %
ERYTHROCYTE [DISTWIDTH] IN BLOOD BY AUTOMATED COUNT: 13.7 % (ref 11.6–15.4)
HCT VFR BLD AUTO: 41 % (ref 37.5–51)
HGB BLD-MCNC: 13.6 G/DL (ref 13–17.7)
IMM GRANULOCYTES # BLD AUTO: 0.1 X10E3/UL (ref 0–0.1)
IMM GRANULOCYTES NFR BLD AUTO: 1 %
IMMATURE CELLS  115398: NORMAL
LYMPHOCYTES # BLD AUTO: 2.2 X10E3/UL (ref 0.7–3.1)
LYMPHOCYTES NFR BLD AUTO: 29 %
MCH RBC QN AUTO: 29.3 PG (ref 26.6–33)
MCHC RBC AUTO-ENTMCNC: 33.2 G/DL (ref 31.5–35.7)
MCV RBC AUTO: 88 FL (ref 79–97)
MONOCYTES # BLD AUTO: 0.8 X10E3/UL (ref 0.1–0.9)
MONOCYTES NFR BLD AUTO: 10 %
MORPHOLOGY BLD-IMP: NORMAL
NEUTROPHILS # BLD AUTO: 4.2 X10E3/UL (ref 1.4–7)
NEUTROPHILS NFR BLD AUTO: 55 %
NRBC BLD AUTO-RTO: NORMAL %
PLATELET # BLD AUTO: 250 X10E3/UL (ref 150–450)
RBC # BLD AUTO: 4.64 X10E6/UL (ref 4.14–5.8)
WBC # BLD AUTO: 7.6 X10E3/UL (ref 3.4–10.8)

## 2023-12-18 DIAGNOSIS — I10 ESSENTIAL HYPERTENSION: ICD-10-CM

## 2023-12-18 DIAGNOSIS — E11.9 TYPE 2 DIABETES MELLITUS WITHOUT COMPLICATION, WITHOUT LONG-TERM CURRENT USE OF INSULIN (HCC): ICD-10-CM

## 2023-12-18 RX ORDER — LOSARTAN POTASSIUM 100 MG/1
100 TABLET ORAL DAILY
Qty: 30 TABLET | Refills: 1 | Status: SHIPPED | OUTPATIENT
Start: 2023-12-18 | End: 2024-01-22

## 2023-12-18 RX ORDER — ATORVASTATIN CALCIUM 20 MG/1
20 TABLET, FILM COATED ORAL
Qty: 30 TABLET | Refills: 1 | Status: SHIPPED | OUTPATIENT
Start: 2023-12-18 | End: 2024-01-22

## 2023-12-27 DIAGNOSIS — K21.9 GASTROESOPHAGEAL REFLUX DISEASE WITHOUT ESOPHAGITIS: ICD-10-CM

## 2023-12-27 RX ORDER — OMEPRAZOLE 20 MG/1
20 CAPSULE, DELAYED RELEASE ORAL 2 TIMES DAILY
Qty: 30 CAPSULE | Refills: 29 | Status: SHIPPED | OUTPATIENT
Start: 2023-12-27

## 2024-01-22 DIAGNOSIS — I10 ESSENTIAL HYPERTENSION: ICD-10-CM

## 2024-01-22 DIAGNOSIS — E11.9 TYPE 2 DIABETES MELLITUS WITHOUT COMPLICATION, WITHOUT LONG-TERM CURRENT USE OF INSULIN (HCC): ICD-10-CM

## 2024-01-22 RX ORDER — ATORVASTATIN CALCIUM 20 MG/1
20 TABLET, FILM COATED ORAL
Qty: 90 TABLET | Refills: 1 | Status: SHIPPED | OUTPATIENT
Start: 2024-01-22

## 2024-01-22 RX ORDER — LOSARTAN POTASSIUM 100 MG/1
100 TABLET ORAL DAILY
Qty: 90 TABLET | Refills: 1 | Status: SHIPPED | OUTPATIENT
Start: 2024-01-22

## 2024-01-22 NOTE — TELEPHONE ENCOUNTER
Pharmacy comment: REQUEST FOR 90 DAYS PRESCRIPTION. DX Code Needed.   Received request via: Pharmacy    Was the patient seen in the last year in this department? Yes    Does the patient have an active prescription (recently filled or refills available) for medication(s) requested? No    Pharmacy Name: [id number] - [place], [place] - [address] [place]. [[id number]]     Does the patient have FPC Plus and need 100 day supply (blood pressure, diabetes and cholesterol meds only)? Patient does not have SCP